# Patient Record
Sex: FEMALE | Race: BLACK OR AFRICAN AMERICAN | NOT HISPANIC OR LATINO | Employment: OTHER | URBAN - METROPOLITAN AREA
[De-identification: names, ages, dates, MRNs, and addresses within clinical notes are randomized per-mention and may not be internally consistent; named-entity substitution may affect disease eponyms.]

---

## 2018-08-22 ENCOUNTER — OFFICE VISIT (OUTPATIENT)
Dept: INTERNAL MEDICINE | Facility: CLINIC | Age: 73
End: 2018-08-22
Payer: MEDICARE

## 2018-08-22 ENCOUNTER — LAB VISIT (OUTPATIENT)
Dept: LAB | Facility: HOSPITAL | Age: 73
End: 2018-08-22
Attending: INTERNAL MEDICINE
Payer: MEDICARE

## 2018-08-22 ENCOUNTER — TELEPHONE (OUTPATIENT)
Dept: INTERNAL MEDICINE | Facility: CLINIC | Age: 73
End: 2018-08-22

## 2018-08-22 VITALS
HEART RATE: 69 BPM | DIASTOLIC BLOOD PRESSURE: 67 MMHG | SYSTOLIC BLOOD PRESSURE: 167 MMHG | TEMPERATURE: 98 F | RESPIRATION RATE: 14 BRPM | HEIGHT: 64 IN

## 2018-08-22 DIAGNOSIS — I10 ESSENTIAL HYPERTENSION: ICD-10-CM

## 2018-08-22 DIAGNOSIS — E78.5 HYPERLIPIDEMIA, UNSPECIFIED HYPERLIPIDEMIA TYPE: ICD-10-CM

## 2018-08-22 DIAGNOSIS — Z79.4 TYPE 2 DIABETES MELLITUS WITH CHRONIC KIDNEY DISEASE, WITH LONG-TERM CURRENT USE OF INSULIN, UNSPECIFIED CKD STAGE: ICD-10-CM

## 2018-08-22 DIAGNOSIS — Z11.59 NEED FOR HEPATITIS C SCREENING TEST: ICD-10-CM

## 2018-08-22 DIAGNOSIS — R56.9 SEIZURE: ICD-10-CM

## 2018-08-22 DIAGNOSIS — S91.001A WOUND OF RIGHT ANKLE, INITIAL ENCOUNTER: ICD-10-CM

## 2018-08-22 DIAGNOSIS — Z76.89 ENCOUNTER TO ESTABLISH CARE: ICD-10-CM

## 2018-08-22 DIAGNOSIS — N18.9 CHRONIC KIDNEY DISEASE, UNSPECIFIED CKD STAGE: ICD-10-CM

## 2018-08-22 DIAGNOSIS — I25.10 CORONARY ARTERY DISEASE INVOLVING NATIVE CORONARY ARTERY OF NATIVE HEART WITHOUT ANGINA PECTORIS: ICD-10-CM

## 2018-08-22 DIAGNOSIS — Z78.0 ASYMPTOMATIC POSTMENOPAUSAL STATE: ICD-10-CM

## 2018-08-22 DIAGNOSIS — Z76.89 ENCOUNTER TO ESTABLISH CARE: Primary | ICD-10-CM

## 2018-08-22 DIAGNOSIS — Z86.73 HISTORY OF STROKE: ICD-10-CM

## 2018-08-22 DIAGNOSIS — E11.22 TYPE 2 DIABETES MELLITUS WITH CHRONIC KIDNEY DISEASE, WITH LONG-TERM CURRENT USE OF INSULIN, UNSPECIFIED CKD STAGE: ICD-10-CM

## 2018-08-22 DIAGNOSIS — I50.9 CONGESTIVE HEART FAILURE, UNSPECIFIED HF CHRONICITY, UNSPECIFIED HEART FAILURE TYPE: ICD-10-CM

## 2018-08-22 DIAGNOSIS — S91.301A WOUND, OPEN, FOOT, RIGHT, INITIAL ENCOUNTER: ICD-10-CM

## 2018-08-22 PROBLEM — E11.9 DIABETES MELLITUS: Status: ACTIVE | Noted: 2018-07-06

## 2018-08-22 PROBLEM — F32.A DEPRESSIVE DISORDER: Status: ACTIVE | Noted: 2018-07-28

## 2018-08-22 LAB
ALBUMIN SERPL BCP-MCNC: 3.6 G/DL
ALP SERPL-CCNC: 68 U/L
ALT SERPL W/O P-5'-P-CCNC: 10 U/L
ANION GAP SERPL CALC-SCNC: 8 MMOL/L
AST SERPL-CCNC: 16 U/L
BASOPHILS # BLD AUTO: 0.06 K/UL
BASOPHILS NFR BLD: 1.2 %
BILIRUB SERPL-MCNC: 0.7 MG/DL
BUN SERPL-MCNC: 53 MG/DL
CALCIUM SERPL-MCNC: 9.8 MG/DL
CHLORIDE SERPL-SCNC: 111 MMOL/L
CHOLEST SERPL-MCNC: 156 MG/DL
CHOLEST/HDLC SERPL: 3.8 {RATIO}
CO2 SERPL-SCNC: 20 MMOL/L
CREAT SERPL-MCNC: 2.2 MG/DL
DIFFERENTIAL METHOD: ABNORMAL
EOSINOPHIL # BLD AUTO: 0.3 K/UL
EOSINOPHIL NFR BLD: 5.5 %
ERYTHROCYTE [DISTWIDTH] IN BLOOD BY AUTOMATED COUNT: 16 %
EST. GFR  (AFRICAN AMERICAN): 25.2 ML/MIN/1.73 M^2
EST. GFR  (NON AFRICAN AMERICAN): 21.9 ML/MIN/1.73 M^2
ESTIMATED AVG GLUCOSE: 111 MG/DL
GLUCOSE SERPL-MCNC: 87 MG/DL
HBA1C MFR BLD HPLC: 5.5 %
HCT VFR BLD AUTO: 33.8 %
HDLC SERPL-MCNC: 41 MG/DL
HDLC SERPL: 26.3 %
HGB BLD-MCNC: 11.4 G/DL
IMM GRANULOCYTES # BLD AUTO: 0.01 K/UL
IMM GRANULOCYTES NFR BLD AUTO: 0.2 %
LDLC SERPL CALC-MCNC: 93.6 MG/DL
LYMPHOCYTES # BLD AUTO: 2.1 K/UL
LYMPHOCYTES NFR BLD: 42.2 %
MCH RBC QN AUTO: 26.4 PG
MCHC RBC AUTO-ENTMCNC: 33.7 G/DL
MCV RBC AUTO: 78 FL
MONOCYTES # BLD AUTO: 0.5 K/UL
MONOCYTES NFR BLD: 8.9 %
NEUTROPHILS # BLD AUTO: 2.1 K/UL
NEUTROPHILS NFR BLD: 42 %
NONHDLC SERPL-MCNC: 115 MG/DL
NRBC BLD-RTO: 0 /100 WBC
PLATELET # BLD AUTO: 224 K/UL
PMV BLD AUTO: 12 FL
POTASSIUM SERPL-SCNC: 5.7 MMOL/L
PROT SERPL-MCNC: 6.9 G/DL
RBC # BLD AUTO: 4.32 M/UL
SODIUM SERPL-SCNC: 139 MMOL/L
TRIGL SERPL-MCNC: 107 MG/DL
TSH SERPL DL<=0.005 MIU/L-ACNC: 1.62 UIU/ML
WBC # BLD AUTO: 5.05 K/UL

## 2018-08-22 PROCEDURE — 99205 OFFICE O/P NEW HI 60 MIN: CPT | Mod: PBBFAC,PO | Performed by: INTERNAL MEDICINE

## 2018-08-22 PROCEDURE — 85025 COMPLETE CBC W/AUTO DIFF WBC: CPT

## 2018-08-22 PROCEDURE — 86803 HEPATITIS C AB TEST: CPT

## 2018-08-22 PROCEDURE — 80061 LIPID PANEL: CPT

## 2018-08-22 PROCEDURE — 80053 COMPREHEN METABOLIC PANEL: CPT

## 2018-08-22 PROCEDURE — 99205 OFFICE O/P NEW HI 60 MIN: CPT | Mod: GW,S$PBB,HPC,ICN | Performed by: INTERNAL MEDICINE

## 2018-08-22 PROCEDURE — 99999 PR PBB SHADOW E&M-NEW PATIENT-LVL V: CPT | Mod: PBBFAC,,, | Performed by: INTERNAL MEDICINE

## 2018-08-22 PROCEDURE — 36415 COLL VENOUS BLD VENIPUNCTURE: CPT | Mod: PO

## 2018-08-22 PROCEDURE — 84443 ASSAY THYROID STIM HORMONE: CPT

## 2018-08-22 PROCEDURE — 83036 HEMOGLOBIN GLYCOSYLATED A1C: CPT

## 2018-08-22 RX ORDER — VENLAFAXINE 75 MG/1
TABLET ORAL
Refills: 2 | COMMUNITY
Start: 2018-08-02 | End: 2018-08-22

## 2018-08-22 RX ORDER — CEFUROXIME AXETIL 500 MG/1
TABLET ORAL
Refills: 0 | COMMUNITY
Start: 2018-06-27 | End: 2018-08-31

## 2018-08-22 RX ORDER — CLOPIDOGREL BISULFATE 75 MG/1
TABLET ORAL
Refills: 1 | COMMUNITY
Start: 2018-07-24

## 2018-08-22 RX ORDER — NAPROXEN SODIUM 220 MG
TABLET ORAL
Refills: 2 | COMMUNITY
Start: 2018-07-06

## 2018-08-22 RX ORDER — LEVETIRACETAM 500 MG/1
TABLET ORAL
Refills: 2 | COMMUNITY
Start: 2018-07-22 | End: 2018-08-31

## 2018-08-22 RX ORDER — SIMVASTATIN 20 MG/1
TABLET, FILM COATED ORAL
Refills: 2 | COMMUNITY
Start: 2018-07-23 | End: 2018-08-31 | Stop reason: ALTCHOICE

## 2018-08-22 RX ORDER — LISINOPRIL 20 MG/1
TABLET ORAL
Refills: 0 | COMMUNITY
Start: 2018-06-27 | End: 2018-08-22 | Stop reason: SINTOL

## 2018-08-22 RX ORDER — NAPROXEN 375 MG/1
TABLET ORAL
Refills: 1 | COMMUNITY
Start: 2018-07-30 | End: 2018-08-22 | Stop reason: SINTOL

## 2018-08-22 RX ORDER — FUROSEMIDE 80 MG/1
TABLET ORAL
Refills: 1 | COMMUNITY
Start: 2018-07-24 | End: 2018-08-31 | Stop reason: ALTCHOICE

## 2018-08-22 RX ORDER — METHOCARBAMOL 750 MG/1
TABLET, FILM COATED ORAL
Refills: 2 | COMMUNITY
Start: 2018-07-23

## 2018-08-22 RX ORDER — PEN NEEDLE, DIABETIC 31 GX5/16"
NEEDLE, DISPOSABLE MISCELLANEOUS
Refills: 2 | COMMUNITY
Start: 2018-06-28 | End: 2018-11-12 | Stop reason: SDUPTHER

## 2018-08-22 RX ORDER — ASPIRIN 81 MG/1
81 TABLET ORAL DAILY
COMMUNITY

## 2018-08-22 NOTE — PROGRESS NOTES
Subjective:       Patient ID: Cesia Stevens is a 73 y.o. female.    Chief Complaint: Annual Exam (get establish) and Shoulder Pain (right)    HPI    73 y.o. female here to discuss healthcare maintenance and f/u of chronic medical conditions. She presents with her daughter and granddaughter. She was 15 minutes late for her appointment. She brings in her current medications, but no other records available. She was recently discharged from the hospital in [place] for seizure, UTI, and sepsis. She does have home health currently.       Health Maintenance:   Lipid disorders/ASCVD risk: due    DM: FBG due  Hepatitis C (5352-3246): due   Breast Cancer (40-74y): Mammogram defer for now   Colorectal Cancer (50-75yr): Colonoscopy declines    DEXA (F>66 yo, M >71yo): due       Vaccines:   Influenza (yearly)   Tetanus (every 10 yrs - 1st tdap) defer    PPSV23(>66yo or <65 w/ lung dz, smoking, DM) pt unsure   PCV13 (> 65 or <65 w/ immunocompromised) pt unsure   Zoster (>59yo) pt unsure     Medical Problems:  1. DM2:  levemir 30u BID, decreased by her family from 75 u bid down to 60 u bid then further decrease to 30 u bid given low normal glucose.  2. HLD: simvastatin 20mg qHS  3. HTN: lisinopril 20mg daily  4. Seizure disorder: keppra 500mg bid  5. CHF: lasix 80mg daily  6. CAD: plavix 75mg daily  7. Shoulder pain: she is following w/ orthopedist at [facility]. She is taking naproxen bid      Past Medical History:   Diagnosis Date    Arthritis     CHF (congestive heart failure)     Coronary artery disease     Diabetes mellitus, type 2     Disorder of kidney and ureter     Hyperlipidemia     Hypertension     Seizures     Stroke      Past Surgical History:   Procedure Laterality Date    CORONARY ARTERY BYPASS GRAFT       No family history on file.  Social History     Socioeconomic History    Marital status: Single     Spouse name: Not on file    Number of children: Not on file    Years of education: Not on file    Highest  "education level: Not on file   Social Needs    Financial resource strain: Not on file    Food insecurity - worry: Not on file    Food insecurity - inability: Not on file    Transportation needs - medical: Not on file    Transportation needs - non-medical: Not on file   Occupational History    Not on file   Tobacco Use    Smoking status: Never Smoker    Smokeless tobacco: Never Used   Substance and Sexual Activity    Alcohol use: No     Frequency: Never    Drug use: No    Sexual activity: No   Other Topics Concern    Not on file   Social History Narrative    Not on file     Review of patient's allergies indicates:  Allergies not on file    Current Outpatient Medications:     aspirin (ECOTRIN) 81 MG EC tablet, Take 81 mg by mouth once daily., Disp: , Rfl:     BD ULTRA-FINE MINI PEN NEEDLE 31 gauge x 3/16" Ndle, USE FIVE TIMES D, Disp: , Rfl: 2    cefUROXime (CEFTIN) 500 MG tablet, TK 1 T PO  BID FOR 14 DAYS, Disp: , Rfl: 0    clopidogrel (PLAVIX) 75 mg tablet, , Disp: , Rfl: 1    furosemide (LASIX) 80 MG tablet, TK 1 T PO  BID, Disp: , Rfl: 1    insulin syringe-needle U-100 1/2 mL 30 gauge Syrg, U BID UTD, Disp: , Rfl: 2    levETIRAcetam (KEPPRA) 500 MG Tab, TK 1 T PO  BID, Disp: , Rfl: 2    lisinopril (PRINIVIL,ZESTRIL) 20 MG tablet, TK 1 T PO BID, Disp: , Rfl: 0    methocarbamol (ROBAXIN) 750 MG Tab, TK 1  T PO HS PRF MUSCLE PAIN, Disp: , Rfl: 2    naproxen (NAPROSYN) 375 MG tablet, TK 1  T PO BID WF PRN P, Disp: , Rfl: 1    simvastatin (ZOCOR) 20 MG tablet, TK 1 T PO QD IN THE EVENING FOR CHOLESTEROL, Disp: , Rfl: 2      Review of Systems   Constitutional: Positive for appetite change. Negative for fever.   HENT: Negative for trouble swallowing.    Eyes: Positive for visual disturbance.   Respiratory: Negative for cough and shortness of breath.    Cardiovascular: Positive for leg swelling. Negative for chest pain.   Gastrointestinal: Negative for abdominal pain and blood in stool. " "  Genitourinary: Positive for frequency. Negative for difficulty urinating.   Musculoskeletal: Positive for arthralgias.   Skin: Positive for wound.   Neurological: Positive for seizures.   Hematological: Negative for adenopathy.       Objective:        Vitals:    08/22/18 1030   BP: (!) 167/67   BP Location: Left arm   Patient Position: Sitting   BP Method: Large (Automatic)   Pulse: 69   Resp: 14   Temp: 97.8 °F (36.6 °C)   TempSrc: Oral   Weight: Comment: unable to  weight   Height: 5' 4" (1.626 m)       There is no height or weight on file to calculate BMI.    Physical Exam   Constitutional: She appears well-developed and well-nourished. No distress.   HENT:   Head: Normocephalic and atraumatic.   Right Ear: External ear normal.   Left Ear: External ear normal.   Nose: Nose normal.   Mouth/Throat: Oropharynx is clear and moist.   Eyes: Conjunctivae and EOM are normal. Right eye exhibits no discharge. Left eye exhibits no discharge.   Neck: Normal range of motion. Neck supple.   Cardiovascular: Normal rate, regular rhythm, normal heart sounds and intact distal pulses.   Pulmonary/Chest: Effort normal and breath sounds normal. No respiratory distress.   Abdominal: Soft. Bowel sounds are normal. There is no tenderness.   Musculoskeletal: She exhibits edema.        Right shoulder: She exhibits decreased range of motion and pain.   Lymphadenopathy:     She has no cervical adenopathy.   Neurological: She is alert. No cranial nerve deficit.   Skin: Skin is warm and dry. She is not diaphoretic. No erythema.   Psychiatric: She has a normal mood and affect. Her behavior is normal. Thought content normal.       Assessment:     1. Encounter to establish care    2. Type 2 diabetes mellitus with chronic kidney disease, with long-term current use of insulin, unspecified CKD stage    3. Seizure    4. Coronary artery disease involving native coronary artery of native heart without angina pectoris    5. History of stroke    6. " Need for hepatitis C screening test    7. Hyperlipidemia, unspecified hyperlipidemia type    8. Essential hypertension    9. Congestive heart failure, unspecified HF chronicity, unspecified heart failure type    10. Chronic kidney disease, unspecified CKD stage    11. Asymptomatic postmenopausal state    12. Wound of right ankle, initial encounter    13. Wound, open, foot, right, initial encounter           Plan:         1. Encounter to establish care  - reviewed healthcare maintenance and pt's chronic medical problems.   - CBC auto differential; Future  - Comprehensive metabolic panel; Future  - Hemoglobin A1c; Future  - Lipid panel; Future  - TSH; Future  - Urinalysis; Future  - Microalbumin/creatinine urine ratio; Future  - Hepatitis C antibody; Future    2. Type 2 diabetes mellitus with chronic kidney disease, with long-term current use of insulin, unspecified CKD stage  - continue levemir 30 u bid. Bring glucose log to f/u appt in 2 weeks.  - Comprehensive metabolic panel; Future  - Hemoglobin A1c; Future  - Urinalysis; Future  - Microalbumin/creatinine urine ratio; Future  - Ambulatory Referral to Optometry    3. Seizure  - continue keppra  - Ambulatory Referral to Neurology    4. Coronary artery disease involving native coronary artery of native heart without angina pectoris  - continue aspirin and statin and plavix  - Ambulatory referral to Cardiology    5. History of stroke  - continue plavix  - Ambulatory Referral to Neurology    6. Need for hepatitis C screening test  - Hepatitis C antibody; Future    7. Hyperlipidemia, unspecified hyperlipidemia type  - continue statin  - Lipid panel; Future    8. Essential hypertension  - continue lisinopril  - TSH; Future    9. Congestive heart failure, unspecified HF chronicity, unspecified heart failure type  - continue ace-i  - Ambulatory referral to Cardiology  - 2D echo with color flow doppler; Future    10. Chronic kidney disease, unspecified CKD stage  - STOP  NAPROXEN  - CBC auto differential; Future  - Ambulatory Referral to Nephrology    11. Asymptomatic postmenopausal state  - DXA Bone Density Spine And Hip; Future    12. Wound of right ankle, initial encounter  - Ambulatory consult to Wound Clinic    13. Wound, open, foot, right, initial encounter  - Ambulatory consult to Wound Clinic      Medical records requested from previous PCP and from recent hospitalization.   rtc 2 weeks w/ glucose log

## 2018-08-22 NOTE — TELEPHONE ENCOUNTER
Please inform patient of test results:   Potassium is elevated. Please stop lisinopril as this can raise the potassium level. Monitor blood pressure closely. We can start a different medication if needed.  Creatinine is elevated, which means kidney function is decreased. STOP NAPROXEN. Avoid any otc NSAIDs (ibuprofen - advil, motrin, aleve, goody's powder, etc.)    Remaining labs still pending.

## 2018-08-23 ENCOUNTER — TELEPHONE (OUTPATIENT)
Dept: INTERNAL MEDICINE | Facility: CLINIC | Age: 73
End: 2018-08-23

## 2018-08-23 DIAGNOSIS — D50.9 MICROCYTIC ANEMIA: Primary | ICD-10-CM

## 2018-08-23 DIAGNOSIS — E87.5 HYPERKALEMIA: ICD-10-CM

## 2018-08-23 PROBLEM — E11.29 TYPE 2 DIABETES MELLITUS WITH KIDNEY COMPLICATION, WITH LONG-TERM CURRENT USE OF INSULIN: Status: ACTIVE | Noted: 2018-07-06

## 2018-08-23 PROBLEM — Z79.4 TYPE 2 DIABETES MELLITUS WITH KIDNEY COMPLICATION, WITH LONG-TERM CURRENT USE OF INSULIN: Status: ACTIVE | Noted: 2018-07-06

## 2018-08-23 LAB — HCV AB SERPL QL IA: NEGATIVE

## 2018-08-24 ENCOUNTER — LAB VISIT (OUTPATIENT)
Dept: LAB | Facility: HOSPITAL | Age: 73
End: 2018-08-24
Attending: INTERNAL MEDICINE
Payer: MEDICARE

## 2018-08-24 DIAGNOSIS — E11.22 TYPE 2 DIABETES MELLITUS WITH CHRONIC KIDNEY DISEASE, WITH LONG-TERM CURRENT USE OF INSULIN, UNSPECIFIED CKD STAGE: ICD-10-CM

## 2018-08-24 DIAGNOSIS — Z79.4 TYPE 2 DIABETES MELLITUS WITH CHRONIC KIDNEY DISEASE, WITH LONG-TERM CURRENT USE OF INSULIN, UNSPECIFIED CKD STAGE: ICD-10-CM

## 2018-08-24 DIAGNOSIS — Z76.89 ENCOUNTER TO ESTABLISH CARE: ICD-10-CM

## 2018-08-24 LAB
ALBUMIN/CREAT UR: 26.5 UG/MG
AMORPH CRY UR QL COMP ASSIST: ABNORMAL
BILIRUB UR QL STRIP: NEGATIVE
CLARITY UR REFRACT.AUTO: ABNORMAL
COLOR UR AUTO: YELLOW
CREAT UR-MCNC: 272 MG/DL
GLUCOSE UR QL STRIP: NEGATIVE
HGB UR QL STRIP: NEGATIVE
HYALINE CASTS UR QL AUTO: 30 /LPF
KETONES UR QL STRIP: NEGATIVE
LEUKOCYTE ESTERASE UR QL STRIP: NEGATIVE
MICROALBUMIN UR DL<=1MG/L-MCNC: 72 UG/ML
MICROSCOPIC COMMENT: ABNORMAL
NITRITE UR QL STRIP: NEGATIVE
PH UR STRIP: 5 [PH] (ref 5–8)
PROT UR QL STRIP: NEGATIVE
SP GR UR STRIP: 1.02 (ref 1–1.03)
SQUAMOUS #/AREA URNS AUTO: 36 /HPF
URN SPEC COLLECT METH UR: ABNORMAL
UROBILINOGEN UR STRIP-ACNC: 2 EU/DL
WBC #/AREA URNS AUTO: 1 /HPF (ref 0–5)

## 2018-08-24 PROCEDURE — 81001 URINALYSIS AUTO W/SCOPE: CPT

## 2018-08-24 PROCEDURE — 82043 UR ALBUMIN QUANTITATIVE: CPT

## 2018-08-30 ENCOUNTER — CLINICAL SUPPORT (OUTPATIENT)
Dept: CARDIOLOGY | Facility: CLINIC | Age: 73
End: 2018-08-30
Attending: INTERNAL MEDICINE
Payer: MEDICARE

## 2018-08-30 ENCOUNTER — APPOINTMENT (OUTPATIENT)
Dept: RADIOLOGY | Facility: CLINIC | Age: 73
End: 2018-08-30
Attending: INTERNAL MEDICINE
Payer: MEDICARE

## 2018-08-30 ENCOUNTER — OFFICE VISIT (OUTPATIENT)
Dept: OPTOMETRY | Facility: CLINIC | Age: 73
End: 2018-08-30
Payer: MEDICARE

## 2018-08-30 DIAGNOSIS — H04.123 DRY EYE SYNDROME OF BILATERAL LACRIMAL GLANDS: ICD-10-CM

## 2018-08-30 DIAGNOSIS — I50.9 CONGESTIVE HEART FAILURE, UNSPECIFIED HF CHRONICITY, UNSPECIFIED HEART FAILURE TYPE: ICD-10-CM

## 2018-08-30 DIAGNOSIS — Z78.0 ASYMPTOMATIC POSTMENOPAUSAL STATE: ICD-10-CM

## 2018-08-30 DIAGNOSIS — E11.3593 PROLIFERATIVE DIABETIC RETINOPATHY OF BOTH EYES ASSOCIATED WITH TYPE 2 DIABETES MELLITUS, UNSPECIFIED PROLIFERATIVE RETINOPATHY TYPE: Primary | ICD-10-CM

## 2018-08-30 LAB
DIASTOLIC DYSFUNCTION: NO
ESTIMATED PA SYSTOLIC PRESSURE: 16.4
MITRAL VALVE MOBILITY: NORMAL
RETIRED EF AND QEF - SEE NOTES: 60 (ref 55–65)

## 2018-08-30 PROCEDURE — 77080 DXA BONE DENSITY AXIAL: CPT | Mod: 26,GW,, | Performed by: INTERNAL MEDICINE

## 2018-08-30 PROCEDURE — 99999 PR PBB SHADOW E&M-EST. PATIENT-LVL II: CPT | Mod: PBBFAC,,, | Performed by: OPTOMETRIST

## 2018-08-30 PROCEDURE — 92004 COMPRE OPH EXAM NEW PT 1/>: CPT | Mod: GW,S$PBB,ICN, | Performed by: OPTOMETRIST

## 2018-08-30 PROCEDURE — 93306 TTE W/DOPPLER COMPLETE: CPT | Mod: PBBFAC,PO | Performed by: INTERNAL MEDICINE

## 2018-08-30 PROCEDURE — 77080 DXA BONE DENSITY AXIAL: CPT | Mod: TC,PO

## 2018-08-30 PROCEDURE — 99212 OFFICE O/P EST SF 10 MIN: CPT | Mod: PBBFAC,PO,25 | Performed by: OPTOMETRIST

## 2018-08-30 NOTE — LETTER
August 30, 2018      Nan Mane MD  [address] [facility] [facility]  [place] [address]           [place] [facility]  [address] [facility] [facility]  [place] [address]  Phone: [phone]  Fax: [phone]          Patient: Cesia Stevens   MR Number: [id number]   YOB: 1945   Date of Visit: 8/30/2018       Dear Dr. Nan Mane:    Thank you for referring Cesia Stevens to me for evaluation. Attached you will find relevant portions of my assessment and plan of care.    If you have questions, please do not hesitate to call me. I look forward to following Cesia Stevens along with you.    Sincerely,    Shira Guerrier, OD    Enclosure  CC:  No Recipients    If you would like to receive this communication electronically, please contact [email or web address] or [phone] to request more information on [facility] access.    For providers and/or their staff who would like to refer a patient to [facility], please contact us through our one-stop-shop provider referral line, Feliciano Bolaños, at [phone].    If you feel you have received this communication in error or would no longer like to receive these types of communications, please e-mail [email or web address]
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
## 2018-08-31 ENCOUNTER — OFFICE VISIT (OUTPATIENT)
Dept: CARDIOLOGY | Facility: CLINIC | Age: 73
End: 2018-08-31
Payer: MEDICARE

## 2018-08-31 ENCOUNTER — OFFICE VISIT (OUTPATIENT)
Dept: NEUROLOGY | Facility: CLINIC | Age: 73
End: 2018-08-31
Payer: MEDICARE

## 2018-08-31 ENCOUNTER — TELEPHONE (OUTPATIENT)
Dept: INTERNAL MEDICINE | Facility: CLINIC | Age: 73
End: 2018-08-31

## 2018-08-31 ENCOUNTER — OFFICE VISIT (OUTPATIENT)
Dept: WOUND CARE | Facility: CLINIC | Age: 73
End: 2018-08-31
Payer: OTHER MISCELLANEOUS

## 2018-08-31 VITALS
DIASTOLIC BLOOD PRESSURE: 68 MMHG | BODY MASS INDEX: 40.91 KG/M2 | HEIGHT: 64 IN | TEMPERATURE: 97 F | DIASTOLIC BLOOD PRESSURE: 57 MMHG | SYSTOLIC BLOOD PRESSURE: 125 MMHG | BODY MASS INDEX: 41.13 KG/M2 | HEIGHT: 64 IN | HEART RATE: 51 BPM | SYSTOLIC BLOOD PRESSURE: 160 MMHG | WEIGHT: 239.63 LBS | HEART RATE: 68 BPM

## 2018-08-31 VITALS — DIASTOLIC BLOOD PRESSURE: 61 MMHG | HEIGHT: 64 IN | HEART RATE: 70 BPM | SYSTOLIC BLOOD PRESSURE: 121 MMHG

## 2018-08-31 DIAGNOSIS — Z95.1 S/P CABG (CORONARY ARTERY BYPASS GRAFT): ICD-10-CM

## 2018-08-31 DIAGNOSIS — E87.5 HYPERKALEMIA: ICD-10-CM

## 2018-08-31 DIAGNOSIS — S91.301A OPEN WOUND OF RIGHT FOOT, INITIAL ENCOUNTER: Primary | ICD-10-CM

## 2018-08-31 DIAGNOSIS — Z79.4 TYPE 2 DIABETES MELLITUS WITH CHRONIC KIDNEY DISEASE, WITH LONG-TERM CURRENT USE OF INSULIN, UNSPECIFIED CKD STAGE: ICD-10-CM

## 2018-08-31 DIAGNOSIS — R56.9 SEIZURE: Primary | ICD-10-CM

## 2018-08-31 DIAGNOSIS — N18.4 STAGE 4 CHRONIC KIDNEY DISEASE: ICD-10-CM

## 2018-08-31 DIAGNOSIS — E11.22 TYPE 2 DIABETES MELLITUS WITH CHRONIC KIDNEY DISEASE, WITH LONG-TERM CURRENT USE OF INSULIN, UNSPECIFIED CKD STAGE: ICD-10-CM

## 2018-08-31 DIAGNOSIS — I25.10 CORONARY ARTERY DISEASE INVOLVING NATIVE CORONARY ARTERY OF NATIVE HEART WITHOUT ANGINA PECTORIS: Primary | ICD-10-CM

## 2018-08-31 DIAGNOSIS — I10 ESSENTIAL HYPERTENSION: ICD-10-CM

## 2018-08-31 DIAGNOSIS — E66.01 MORBID OBESITY: ICD-10-CM

## 2018-08-31 DIAGNOSIS — Z86.73 HISTORY OF STROKE: ICD-10-CM

## 2018-08-31 DIAGNOSIS — E78.5 HYPERLIPIDEMIA, UNSPECIFIED HYPERLIPIDEMIA TYPE: ICD-10-CM

## 2018-08-31 PROCEDURE — 11042 DBRDMT SUBQ TIS 1ST 20SQCM/<: CPT | Mod: S$PBB,GV,, | Performed by: NURSE PRACTITIONER

## 2018-08-31 PROCEDURE — 99213 OFFICE O/P EST LOW 20 MIN: CPT | Mod: PBBFAC,25,27 | Performed by: PSYCHIATRY & NEUROLOGY

## 2018-08-31 PROCEDURE — 99205 OFFICE O/P NEW HI 60 MIN: CPT | Mod: GW,S$PBB,HPC,ICN | Performed by: PSYCHIATRY & NEUROLOGY

## 2018-08-31 PROCEDURE — 99213 OFFICE O/P EST LOW 20 MIN: CPT | Mod: PBBFAC,27,25 | Performed by: INTERNAL MEDICINE

## 2018-08-31 PROCEDURE — 99213 OFFICE O/P EST LOW 20 MIN: CPT | Mod: PBBFAC,25 | Performed by: NURSE PRACTITIONER

## 2018-08-31 PROCEDURE — 99203 OFFICE O/P NEW LOW 30 MIN: CPT | Mod: 25,S$PBB,GV, | Performed by: NURSE PRACTITIONER

## 2018-08-31 PROCEDURE — 99999 PR PBB SHADOW E&M-EST. PATIENT-LVL III: CPT | Mod: PBBFAC,,, | Performed by: NURSE PRACTITIONER

## 2018-08-31 PROCEDURE — 11042 DBRDMT SUBQ TIS 1ST 20SQCM/<: CPT | Mod: PBBFAC | Performed by: NURSE PRACTITIONER

## 2018-08-31 PROCEDURE — 99999 PR PBB SHADOW E&M-EST. PATIENT-LVL III: CPT | Mod: PBBFAC,,, | Performed by: PSYCHIATRY & NEUROLOGY

## 2018-08-31 PROCEDURE — 99999 PR PBB SHADOW E&M-EST. PATIENT-LVL III: CPT | Mod: PBBFAC,,, | Performed by: INTERNAL MEDICINE

## 2018-08-31 PROCEDURE — 99204 OFFICE O/P NEW MOD 45 MIN: CPT | Mod: GW,S$PBB,, | Performed by: INTERNAL MEDICINE

## 2018-08-31 RX ORDER — TRAZODONE HYDROCHLORIDE 50 MG/1
50 TABLET ORAL NIGHTLY
Qty: 30 TABLET | Refills: 11 | Status: SHIPPED | OUTPATIENT
Start: 2018-08-31 | End: 2019-08-31

## 2018-08-31 RX ORDER — DONEPEZIL HYDROCHLORIDE 5 MG/1
5 TABLET, FILM COATED ORAL NIGHTLY
Qty: 30 TABLET | Refills: 11 | Status: SHIPPED | OUTPATIENT
Start: 2018-08-31 | End: 2018-09-19

## 2018-08-31 RX ORDER — ATORVASTATIN CALCIUM 40 MG/1
40 TABLET, FILM COATED ORAL DAILY
Qty: 30 TABLET | Refills: 11 | Status: SHIPPED | OUTPATIENT
Start: 2018-08-31

## 2018-08-31 NOTE — TELEPHONE ENCOUNTER
----- Message from Nan Mane MD sent at 8/31/2018  8:03 AM CDT -----  Heart pumping and relaxation function is normal.

## 2018-08-31 NOTE — TELEPHONE ENCOUNTER
Daughter answered phone.  They are heading to wound care today.  She will give her results of heart test.  I gave all of 's comments.  She expressed understanding.

## 2018-08-31 NOTE — PROGRESS NOTES
Name: Cesia Stevens  MRN: [id number]   CSN: [id number]      Date: 08/31/2018    HISTORY OF PRESENT ILLNESS (HPI)  The patient is a 73 y.o. BF  The patient was accompanied today by family members who provided some of the history.    Pt had confusional episode at home in June.  Not clearly a seizure.  Family more concerned about chronic decline in memory and judgment and suspect some degree of dementia  Confusion is more persistent than episodic and therfore seizures less likely  They report that pt becoming more paranoid and resists taking meds  She had been prescribed Keppra 500mg BID for recent episode, but from description was more typical episode of confusion with no clear description of seizure.  Not tolerating Keppra very well and doesn't want to take.        Seizure Triggers  Sleep Deprivation - None  Other medications - None  Psych/stress - None  Photic stimulation - None  Hyperventilation - None  Medical Problems - None  Menses - No  Sensory Stimulation (light, sound, etc) - None  Missed dose of meds - None    AED Treatments  Present regimen  Keppra 500mg BID    Prior treatments      Not tried  acetazolamide (Diamox, AZM)  amantadine  carbamazepine (Tegretol, CBZ)  clobezam (Onfi or Frizium, CLB)  ethosuximide (Zarontin, ESM)  eslicarbazine (Aptiom, ESL)  felbamate (felbatol, FBM)  gabapentin (Neurontin, GBP)  lacosamide (Vimpat, LCM)   lamotrigine (Lamictal, LTG)   levetiracetam (Keppra, LEV)  methsuximide (Celontin, MSM)  methyphenytoin (Mesantion, MHT)  oxcarbazepine (Trileptal OXC)  perampanel (Fycompa, FCP)   phenobarbital (Pb)  phenytoin (Dilantin, PHT)  pregabalin (Lyrica, PGB)  primidone (Mysoline, PRM)  retigabine (Potiga, RTG)  rufinamide (Banzel, RUF)  tiagabine (Gabatril,  TGB)  topiramate (Topamax, TPM)  viagabatrin, (Sabril, VGB)  vagal nerve stimulator (VNS)  valproic acid (Depakote, VPA)  zonisamide (Zonegran, ZNS)  Benzodiazepines  diazepam - rectal (Diastatl)  diazepam - oral (Valium,  DZ)  clonazepam (Klonopin, CZP)  clorazepate (Tranxene, CLZ)  Ativan  Brain Stimulation  Vagal Nerve Stimulation-n/a  DBS- n/a    Compliance method  Memory - yes  Mom or Spouse - Yes  Pill Box - no  Jeronimo calendar - no  Turn over medication bottle - no  Phone alarm - no    Seizure Evaluation  EEG Routine -   EEG Ambulatory -   EEG\Video Monitoring -   MRI/MRA -   CT/CTA Scan -   PET Scan -   Neuropsychological evaluation -   DEXA Scan    Potential Epilepsy Risk Factors:   Pregnancy/Labor/Delivery - full term uncomplicated pregnancy labor and vaginal delivery  Febrile seizures - none  Head injury  - none  CNS infection - none     Stroke - none  Family Hx of Sz - none    PAST MEDICAL HISTORY:   Active Ambulatory Problems     Diagnosis Date Noted    Congestive heart failure 04/05/2006    Depressive disorder 07/28/2018    Type 2 diabetes mellitus with kidney complication, with long-term current use of insulin 07/06/2018    Hyperlipidemia 07/06/2018    Essential hypertension 07/06/2018    Seizure 06/11/2018    History of stroke 08/22/2018    Chronic kidney disease 08/22/2018    Microcytic anemia 08/23/2018    Hyperkalemia 08/23/2018     Resolved Ambulatory Problems     Diagnosis Date Noted    No Resolved Ambulatory Problems     Past Medical History:   Diagnosis Date    Arthritis     CHF (congestive heart failure)     Coronary artery disease     Diabetes mellitus, type 2     Disorder of kidney and ureter     Hyperlipidemia     Hypertension     Seizures     Stroke         PAST SURGICAL HISTORY:   Past Surgical History:   Procedure Laterality Date    CORONARY ARTERY BYPASS GRAFT      HYSTERECTOMY      PERIPHERAL ARTERIAL STENT GRAFT          FAMILY HISTORY: No family history on file.      SOCIAL HISTORY:   Social History     Socioeconomic History    Marital status: Single     Spouse name: Not on file    Number of children: Not on file    Years of education: Not on file    Highest education level:  Not on file   Social Needs    Financial resource strain: Not on file    Food insecurity - worry: Not on file    Food insecurity - inability: Not on file    Transportation needs - medical: Not on file    Transportation needs - non-medical: Not on file   Occupational History    Not on file   Tobacco Use    Smoking status: Never Smoker    Smokeless tobacco: Never Used   Substance and Sexual Activity    Alcohol use: No     Frequency: Never    Drug use: No    Sexual activity: No   Other Topics Concern    Not on file   Social History Narrative    Not on file        SUBSTANCE USE:  Social History     Tobacco Use    Smoking status: Never Smoker    Smokeless tobacco: Never Used   Substance and Sexual Activity    Alcohol use: No     Frequency: Never    Drug use: No    Sexual activity: No      Social History     Tobacco Use    Smoking status: Never Smoker    Smokeless tobacco: Never Used   Substance Use Topics    Alcohol use: No     Frequency: Never        ALLERGIES: Patient has no known allergies.       Review of Systems   Constitutional: Negative for chills, diaphoresis, fever, malaise/fatigue and weight loss.   HENT: Negative for ear pain, hearing loss, nosebleeds and tinnitus.    Eyes: Negative for blurred vision, double vision, photophobia and pain.   Respiratory: Negative for cough, hemoptysis and shortness of breath.    Cardiovascular: Negative for chest pain, palpitations, orthopnea and leg swelling.   Gastrointestinal: Negative for abdominal pain, blood in stool, constipation, diarrhea, heartburn, nausea and vomiting.   Genitourinary: Negative for dysuria and hematuria.   Musculoskeletal: Negative for falls, joint pain and myalgias.   Skin: Negative for itching and rash.   Neurological: Negative for dizziness, tingling, tremors, sensory change, speech change, focal weakness, loss of consciousness, weakness and headaches.   Endo/Heme/Allergies: Negative for environmental allergies. Does not  bruise/bleed easily.   Psychiatric/Behavioral: Negative for depression, hallucinations, memory loss and substance abuse. The patient is not nervous/anxious and does not have insomnia.          PHYSICAL EXAM:    There were no vitals taken for this visit.      Neurologic Exam      Higher Cortical Function:    Patient is a well developed, pleasant, well groomed individual appearing their stated age  Oriented - intact to person, place and time    Fund of knowledge was appropriate.    Language - Speech was fluent without evidence for an aphasia.  R-L Orientation - Intact   Agnosias, agraphesthesia, or astereognosis - not present.   Cranial Nerves II - XII:    EOMs were intact with normal smooth and no nystagmus.    PERRLA. Funduscopic exam - disc were flat with normal A/V ratio and no exudates or hemorrhages.   Visual fields were full to confrontation.    Motor - facial movement was symmetrical and normal.    Facial sensory - Light touch and pin prick sensations were normal.    Hearing was normal.  Palate moved well and was symmetrical    Tongue movement was full & the patient could speak without difficulty.  Motor: Power, bulk and tone were normal in all extremities.  Coordination:  Normal  Gait:  Normal  Tremor: resting, postural, intentional - none  Cardiovascular:  No edema  Skin: No rash         IMPRESSION  1. Single spell. Not clearly seizure like. Not toelrating Keppra well  2. Chronic cognitive decline, possible dementia      DISPOSITION:   OK to hold Keppra for now given single episode only and no clear description of seizure or h/o epilepsy  Refer to Dr Boone for dementia evaluation  Prescribe Aricept and trazodone today in meantime to hopefully help with memory/poor sleep  If seizure like episodes progress in future can work up further for epilepsy, either by DR Boone or myself

## 2018-08-31 NOTE — PROGRESS NOTES
Subjective:       Patient ID: Cesia Stevens is a 73 y.o. female.    Chief Complaint: Wound Check    Mrs. Stevens is a 73 year old female who was living in [place] with her . She injured her right foot on a nail. Treatment was received at the time but she can not indicate what the treatment was. She verbalized being up to date on her tetnus immunization. Her family noticed that she was not caring for herself properly due to multiple medical conditions and a history of CVA with right sided weakness. She and her  were brought to live with a daughter who now assumes care for her. The daughter reports having home health services for wound care but they have been discharged. They report cleaning the wound with soap and water and keeping covered. She is here today for an evaluation and recommendations. Healing delayed and complicated by DM Type 2 insulin dependent.      Review of Systems   Constitutional: Negative for chills, diaphoresis, fatigue and fever.   HENT: Negative.  Negative for ear pain, nosebleeds, sinus pain, sore throat and trouble swallowing.    Eyes: Positive for visual disturbance. Negative for pain and redness.   Respiratory: Positive for cough. Negative for shortness of breath and wheezing.         Reports hx of asthma   Cardiovascular: Negative for chest pain and palpitations.        Hx of CHF, Hx of MI, Hx of CABG, Hx of CVA   Gastrointestinal: Negative.  Negative for abdominal distention, abdominal pain, blood in stool, nausea and vomiting.   Endocrine: Negative for polydipsia, polyphagia and polyuria.        Type 2 DM insulin dependent   Genitourinary: Negative.  Negative for flank pain and hematuria.   Musculoskeletal: Positive for arthralgias (knees). Negative for back pain, joint swelling and myalgias.        Torn rotator cuff right shoulder   Skin: Positive for wound.   Allergic/Immunologic: Negative.    Neurological: Positive for seizures (One seizure in June, daughter states  from UTI. Medication discontinued today. ) and weakness (right sided weakness from CVA, general debility). Negative for facial asymmetry and headaches.   Hematological: Negative.  Negative for adenopathy. Does not bruise/bleed easily.   Psychiatric/Behavioral: Positive for confusion (dementia). Negative for agitation, dysphoric mood and sleep disturbance. The patient is not nervous/anxious.        Objective:      Physical Exam   Constitutional: She is oriented to person, place, and time. Vital signs are normal. She appears well-developed. No distress.   HENT:   Head: Normocephalic.   Mouth/Throat: Oropharynx is clear and moist.   Eyes: Conjunctivae, EOM and lids are normal. Pupils are equal, round, and reactive to light.   Neck: Trachea normal and normal range of motion. Carotid bruit is not present. No thyromegaly present.   Cardiovascular: Normal rate, normal heart sounds and intact distal pulses. An irregular rhythm present.   Pulses:       Popliteal pulses are 0 on the right side, and 0 on the left side.        Dorsalis pedis pulses are 1+ on the right side, and 0 on the left side.        Posterior tibial pulses are 1+ on the right side.   Pulmonary/Chest: Effort normal and breath sounds normal. No respiratory distress. She has no wheezes. She has no rales.   Abdominal: Soft. Bowel sounds are normal. She exhibits no distension. There is no tenderness.   Musculoskeletal:        Right shoulder: She exhibits swelling (BLE).        Right foot: There is decreased range of motion.        Feet:    Feet:   Right Foot:   Protective Sensation: 5 sites tested. 3 sites sensed.   Skin Integrity: Positive for ulcer, callus and dry skin.   Left Foot:   Protective Sensation: 0 sites tested.   Lymphadenopathy:     She has no cervical adenopathy.   Neurological: She is alert and oriented to person, place, and time.   Skin: Skin is warm and dry. Capillary refill takes 2 to 3 seconds. She is not diaphoretic. No erythema.    Psychiatric: She has a normal mood and affect. Thought content normal.   Vitals reviewed.          Procedure: Cesia was seen in the clinic room and placed in the supine position on the treatment table. Attention was directed to the Right 5th metatarsal head. The wound was covered with 100% fibrin film and a  callused rim. No acute signs of infection were noted. The wound was non-tender. The wound was prepped with betadine. Utilizing a 3mm curette, I debrided the subcutaneous tissue to excise viable tissue inside the wound margins. The patient tolerated well. Because of a lack of sensation, anesthesia was not necessary. The wound was flushed with wound cleanser There was minimal bleeding with debridement which was well controlled with direct pressure. The wound was then dressed with endoform followed by a border gauze dressing. The patient tolerated the procedure well.  Lab Results   Component Value Date    HGBA1C 5.5 08/22/2018     Lab Results   Component Value Date    ALBUMIN 3.6 08/22/2018     Assessment:       1. Open wound of right foot, initial encounter    2. Type 2 diabetes mellitus with chronic kidney disease, with long-term current use of insulin, unspecified CKD stage        Plan:       Clean wound with wound cleanser or normal saline  Apply collagen dressing to wound bed  Cut a small piece the size of the wound  Cover with a border gauze  Change daily  Return to clinic in 2 weeks  Monitor for signs of infection

## 2018-08-31 NOTE — LETTER
August 31, 2018      Nan Mane MD  [address]  [place] [address]           [facility]  [address]  [place] [address]  Phone: [phone]          Patient: Cesia Stevens   MR Number: [id number]   YOB: 1945   Date of Visit: 8/31/2018       Dear Dr. Nan Mane:    Thank you for referring Cesia Stevens to me for evaluation. Attached you will find relevant portions of my assessment and plan of care.    If you have questions, please do not hesitate to call me. I look forward to following Cesia Stevens along with you.    Sincerely,    Jose Mike MD    Enclosure  CC:  No Recipients    If you would like to receive this communication electronically, please contact [email or web address] or [phone] to request more information on [facility] Link access.    For providers and/or their staff who would like to refer a patient to [facility], please contact us through our one-stop-shop provider referral line, [facility] , at [phone].    If you feel you have received this communication in error or would no longer like to receive these types of communications, please e-mail [email or web address]

## 2018-08-31 NOTE — LETTER
August 31, 2018      Nan Mane MD  [address]  [place] [address]           [facility]  [address]  [place] [address]  Phone: [phone]          Patient: Cesia Stevens   MR Number: [id number]   YOB: 1945   Date of Visit: 8/31/2018       Dear Dr. Nan Mane:    Thank you for referring Cesia Stevens to me for evaluation. Attached you will find relevant portions of my assessment and plan of care.    If you have questions, please do not hesitate to call me. I look forward to following Cesia Stevens along with you.    Sincerely,    Lani Lu NP    Enclosure  CC:  No Recipients    If you would like to receive this communication electronically, please contact [email or web address] or [phone] to request more information on [employer] Link access.    For providers and/or their staff who would like to refer a patient to [facility], please contact us through our one-stop-shop provider referral line, [facility], at [phone].    If you feel you have received this communication in error or would no longer like to receive these types of communications, please e-mail [email or web address]

## 2018-08-31 NOTE — LETTER
September 4, 2018      Nan Mane MD  [address]  [place] [address]           [facility] Epile[facility]  [address]  [place] [address]  Phone: [phone]  Fax: [phone]          Patient: Cesia Stevens   MR Number: [id number]   YOB: 1945   Date of Visit: 8/31/2018       Dear Dr. Nan Mane:    Thank you for referring Cesia Stevens to me for evaluation. Attached you will find relevant portions of my assessment and plan of care.    If you have questions, please do not hesitate to call me. I look forward to following Cesia Stevens along with you.    Sincerely,    Angelo Stokes MD    Enclosure  CC:  No Recipients    If you would like to receive this communication electronically, please contact [email or web address] or [phone] to request more information on [employer] Link access.    For providers and/or their staff who would like to refer a patient to [facility], please contact us through our one-stop-shop provider referral line, [facility], at [phone].    If you feel you have received this communication in error or would no longer like to receive these types of communications, please e-mail [email or web address]

## 2018-08-31 NOTE — PROGRESS NOTES
Subjective:   Patient ID:  Cesia Stevens is a 73 y.o. female who presents for evaluation of Coronary artery disease involving native coronary artery of       HPI:   Cesia Stevenspresents to establish cardiovascular care.Cesia Stevens has known coronary artery disease having had CABG @ 2005 having had prior intervention. States that she has had no further intervention since her CABG. She is wheelchair bound due to a fractured right leg and a history of strokes involving her right side. Echos here and in [facility] Have demonstrated normal LV systolic and diastolic function. She is on Lasix . Cesia Stevens chronic kidney disease and hypertension. Her kidney function has deteriorated and K+ elevated therefore her ACE was stopped. Cesia Stevens denies chest pain, shortness of breath, palpitations, presyncope , or syncope. Cesia Stevens has dyslipidemia  on moderate intensity statin therapy not at goal. Cesia Stevens has diabetes.  Review of Systems   Constitution: Positive for weakness. Negative for malaise/fatigue, weight gain and weight loss.   Eyes: Negative for blurred vision.   Cardiovascular: Negative for chest pain, claudication, cyanosis, dyspnea on exertion, irregular heartbeat, leg swelling, near-syncope, orthopnea, palpitations, paroxysmal nocturnal dyspnea and syncope.   Respiratory: Negative for cough, shortness of breath and wheezing.    Musculoskeletal: Positive for joint pain. Negative for falls and myalgias.   Gastrointestinal: Negative for abdominal pain, heartburn, nausea and vomiting.   Genitourinary: Negative for nocturia.   Neurological: Positive for seizures. Negative for brief paralysis, dizziness, focal weakness, headaches, numbness and paresthesias.        Hx of CVAs   Psychiatric/Behavioral: Negative for altered mental status.       Current Outpatient Medications   Medication Sig    aspirin (ECOTRIN) 81 MG EC tablet Take 81 mg by mouth once daily.    atorvastatin (LIPITOR) 40 MG tablet  "Take 1 tablet (40 mg total) by mouth once daily.    BD ULTRA-FINE MINI PEN NEEDLE 31 gauge x 3/16" Ndle USE FIVE TIMES D    clopidogrel (PLAVIX) 75 mg tablet     donepezil (ARICEPT) 5 MG tablet Take 1 tablet (5 mg total) by mouth every evening.    insulin syringe-needle U-100 1/2 mL 30 gauge Syrg U BID UTD    methocarbamol (ROBAXIN) 750 MG Tab TK 1  T PO HS PRF MUSCLE PAIN    traZODone (DESYREL) 50 MG tablet Take 1 tablet (50 mg total) by mouth every evening.     No current facility-administered medications for this visit.      Objective:   Physical Exam   Constitutional: She is oriented to person, place, and time. She appears well-developed. No distress.   BP (!) 125/57 (BP Location: Left arm, Patient Position: Sitting, BP Method: X-Large (Automatic))   Pulse (!) 51   Ht 5' 4" (1.626 m)   BMI 41.13 kg/m²   Examined in a wheelchair   HENT:   Head: Normocephalic.   Eyes: Conjunctivae are normal. Pupils are equal, round, and reactive to light. No scleral icterus.   Neck: Neck supple. No JVD present. No thyromegaly present.   Cardiovascular: Normal rate, regular rhythm and intact distal pulses. PMI is not displaced. Exam reveals no gallop and no friction rub.   Murmur heard.   Medium-pitched midsystolic murmur is present with a grade of 1/6 at the upper left sternal border.  Pulmonary/Chest: Effort normal and breath sounds normal. No respiratory distress. She has no wheezes. She has no rales.   Median sternotomy scar.   Abdominal: Soft. She exhibits no distension. There is no splenomegaly or hepatomegaly. There is no tenderness.   Musculoskeletal: She exhibits no edema or tenderness.   Wheelchair   Neurological: She is alert and oriented to person, place, and time.   Skin: Skin is warm and dry. She is not diaphoretic.   Psychiatric: She has a normal mood and affect. Her behavior is normal.       Lab Results   Component Value Date     08/22/2018    K 5.7 (H) 08/22/2018     (H) 08/22/2018    CO2 20 " (L) 08/22/2018    BUN 53 (H) 08/22/2018    CREATININE 2.2 (H) 08/22/2018    GLU 87 08/22/2018    HGBA1C 5.5 08/22/2018    AST 16 08/22/2018    ALT 10 08/22/2018    ALBUMIN 3.6 08/22/2018    PROT 6.9 08/22/2018    BILITOT 0.7 08/22/2018    WBC 5.05 08/22/2018    HGB 11.4 (L) 08/22/2018    HCT 33.8 (L) 08/22/2018    MCV 78 (L) 08/22/2018     08/22/2018    TSH 1.624 08/22/2018    CHOL 156 08/22/2018    HDL 41 08/22/2018    LDLCALC 93.6 08/22/2018    TRIG 107 08/22/2018       Assessment:     1. Coronary artery disease involving native coronary artery of native heart without angina pectoris : Stable   2. S/P CABG (coronary artery bypass graft)    3. Essential hypertension : Good control.   4. Hyperkalemia : recently   5. Hyperlipidemia, unspecified hyperlipidemia type : Not at LDL goal   6. Stage 4 chronic kidney disease : Recent worsening   7. History of stroke    8. Type 2 diabetes mellitus with chronic kidney disease, with long-term current use of insulin, unspecified CKD stage    9. Morbid obesity        Plan:     Cesia was seen today for coronary artery disease involving native coronary artery of  and congestive heart failure, unspecified hf chronicity, unspeci.    Diagnoses and all orders for this visit:    Essential hypertension  Continue current regimen    Coronary artery disease involving native coronary artery of native heart without angina pectoris    S/P CABG (coronary artery bypass graft)    Hyperkalemia  ACE stopped  Hyperlipidemia, unspecified hyperlipidemia type  -     atorvastatin (LIPITOR) 40 MG tablet; Take 1 tablet (40 mg total) by mouth once daily.  -     Lipid panel; Future; Expected date: 10/30/2018  Mediterranean Diet recommended with carbohydrate restriction  Stage 4 chronic kidney disease  Stop Furosemide with normal LV function  History of stroke    Type 2 diabetes mellitus with chronic kidney disease, with long-term current use of insulin, unspecified CKD stage    Morbid  Obesity  Mediterranean Diet recommended with carbohydrate restriction

## 2018-08-31 NOTE — PATIENT INSTRUCTIONS
Stp Furosemide  Stop Simvastatin and begin Atorvastatin 40 mg daily  Mediterranean Diet recommended with carbohydrate restriction

## 2018-09-04 PROBLEM — M81.0 AGE-RELATED OSTEOPOROSIS WITHOUT CURRENT PATHOLOGICAL FRACTURE: Status: ACTIVE | Noted: 2018-09-04

## 2018-09-05 ENCOUNTER — TELEPHONE (OUTPATIENT)
Dept: INTERNAL MEDICINE | Facility: CLINIC | Age: 73
End: 2018-09-05

## 2018-09-05 ENCOUNTER — TELEPHONE (OUTPATIENT)
Dept: CARDIOLOGY | Facility: CLINIC | Age: 73
End: 2018-09-05

## 2018-09-05 NOTE — TELEPHONE ENCOUNTER
----- Message from Nan Mane MD sent at 9/4/2018  6:05 PM CDT -----  Bone density shows osteoporosis. We will discuss prescription treatment options at her appt on 9/10. Recommend starting the follow otc now - calcium 7899-6763 mg daily and vitamin D 800-1000 units daily

## 2018-09-05 NOTE — TELEPHONE ENCOUNTER
Cesia Garcia is a pt of Dr. Mike. Joan, nurse with [facility] [[phone]] is calling. Says that she is calling to confirm that Dr. Mike stopped the pt's Lasix.  Says that the pt has 3+ pitting edema of her lower extremities. Says BP is 140/75 HR 50, O2 sat on RA was 99% and lungs were essentially clear. Says that pt is in a w/c and legs are dependent a good bit of the time. Informed nurse that pt Lasix was stopped and atorvastatin was started. Pt had an echo the day before her LV with Dr. Mike on 8-31-18 and that her  LV function was normal, and the pt has worsening kidney disease, and is morbidly obese. Please advise. Thanks, Pat

## 2018-09-05 NOTE — TELEPHONE ENCOUNTER
----- Message from Jennifer Tadeo sent at 9/5/2018  2:23 PM CDT -----  Contact: Joan Horan RN with [facility] [phone]  Joan says the pt informed her that Dr. Mike told her she does not have heart failure and he also took her off her lasix. She says she just need to verify because the pt does seem to be forgetful. The pt also has edema in her lower legs.  LOV 8/31/18 Dr. Mike    Thanks

## 2018-09-06 ENCOUNTER — TELEPHONE (OUTPATIENT)
Dept: CARDIOLOGY | Facility: CLINIC | Age: 73
End: 2018-09-06

## 2018-09-06 NOTE — TELEPHONE ENCOUNTER
Called and spoke with Joan/nurse/[facility]. Gave her the message from Dr. Mike. Advised her to have the pt elevate her legs during the day.

## 2018-09-10 ENCOUNTER — OFFICE VISIT (OUTPATIENT)
Dept: INTERNAL MEDICINE | Facility: CLINIC | Age: 73
End: 2018-09-10
Payer: MEDICARE

## 2018-09-10 ENCOUNTER — LAB VISIT (OUTPATIENT)
Dept: LAB | Facility: HOSPITAL | Age: 73
End: 2018-09-10
Attending: INTERNAL MEDICINE
Payer: MEDICARE

## 2018-09-10 VITALS
OXYGEN SATURATION: 98 % | HEIGHT: 64 IN | DIASTOLIC BLOOD PRESSURE: 60 MMHG | TEMPERATURE: 98 F | HEART RATE: 73 BPM | BODY MASS INDEX: 40.91 KG/M2 | RESPIRATION RATE: 18 BRPM | WEIGHT: 239.63 LBS | SYSTOLIC BLOOD PRESSURE: 118 MMHG

## 2018-09-10 DIAGNOSIS — R60.0 BILATERAL LOWER EXTREMITY EDEMA: ICD-10-CM

## 2018-09-10 DIAGNOSIS — Z79.4 TYPE 2 DIABETES MELLITUS WITH CHRONIC KIDNEY DISEASE, WITH LONG-TERM CURRENT USE OF INSULIN, UNSPECIFIED CKD STAGE: Primary | ICD-10-CM

## 2018-09-10 DIAGNOSIS — E11.22 TYPE 2 DIABETES MELLITUS WITH CHRONIC KIDNEY DISEASE, WITH LONG-TERM CURRENT USE OF INSULIN, UNSPECIFIED CKD STAGE: Primary | ICD-10-CM

## 2018-09-10 DIAGNOSIS — N18.4 STAGE 4 CHRONIC KIDNEY DISEASE: ICD-10-CM

## 2018-09-10 DIAGNOSIS — M81.0 AGE-RELATED OSTEOPOROSIS WITHOUT CURRENT PATHOLOGICAL FRACTURE: ICD-10-CM

## 2018-09-10 DIAGNOSIS — D50.9 MICROCYTIC ANEMIA: ICD-10-CM

## 2018-09-10 DIAGNOSIS — E87.5 HYPERKALEMIA: ICD-10-CM

## 2018-09-10 DIAGNOSIS — K64.9 HEMORRHOIDS, UNSPECIFIED HEMORRHOID TYPE: ICD-10-CM

## 2018-09-10 LAB
25(OH)D3+25(OH)D2 SERPL-MCNC: 12 NG/ML
ALBUMIN SERPL BCP-MCNC: 3.3 G/DL
ALP SERPL-CCNC: 61 U/L
ALT SERPL W/O P-5'-P-CCNC: 8 U/L
ANION GAP SERPL CALC-SCNC: 5 MMOL/L
AST SERPL-CCNC: 15 U/L
BILIRUB SERPL-MCNC: 0.6 MG/DL
BUN SERPL-MCNC: 26 MG/DL
CALCIUM SERPL-MCNC: 9.7 MG/DL
CHLORIDE SERPL-SCNC: 112 MMOL/L
CO2 SERPL-SCNC: 22 MMOL/L
CREAT SERPL-MCNC: 1.6 MG/DL
EST. GFR  (AFRICAN AMERICAN): 37.1 ML/MIN/1.73 M^2
EST. GFR  (NON AFRICAN AMERICAN): 32.2 ML/MIN/1.73 M^2
FERRITIN SERPL-MCNC: 228 NG/ML
GLUCOSE SERPL-MCNC: 130 MG/DL
IRON SERPL-MCNC: 58 UG/DL
POTASSIUM SERPL-SCNC: 5.5 MMOL/L
PROT SERPL-MCNC: 6.3 G/DL
SATURATED IRON: 25 %
SODIUM SERPL-SCNC: 139 MMOL/L
TOTAL IRON BINDING CAPACITY: 235 UG/DL
TRANSFERRIN SERPL-MCNC: 159 MG/DL

## 2018-09-10 PROCEDURE — 99214 OFFICE O/P EST MOD 30 MIN: CPT | Mod: S$PBB,GW,, | Performed by: INTERNAL MEDICINE

## 2018-09-10 PROCEDURE — 99999 PR PBB SHADOW E&M-EST. PATIENT-LVL III: CPT | Mod: PBBFAC,,, | Performed by: INTERNAL MEDICINE

## 2018-09-10 PROCEDURE — 36415 COLL VENOUS BLD VENIPUNCTURE: CPT | Mod: PO

## 2018-09-10 PROCEDURE — 82728 ASSAY OF FERRITIN: CPT

## 2018-09-10 PROCEDURE — 99213 OFFICE O/P EST LOW 20 MIN: CPT | Mod: PBBFAC,PO | Performed by: INTERNAL MEDICINE

## 2018-09-10 PROCEDURE — 80053 COMPREHEN METABOLIC PANEL: CPT

## 2018-09-10 PROCEDURE — 82306 VITAMIN D 25 HYDROXY: CPT

## 2018-09-10 PROCEDURE — 83540 ASSAY OF IRON: CPT

## 2018-09-10 RX ORDER — HYDROCORTISONE 25 MG/G
CREAM TOPICAL
Qty: 30 G | Refills: 0 | Status: SHIPPED | OUTPATIENT
Start: 2018-09-10 | End: 2018-09-20

## 2018-09-10 NOTE — PROGRESS NOTES
"Subjective:       Patient ID: Cesia Stevens is a 73 y.o. female.    Chief Complaint: Follow-up (2 week )    HPI    DM2: She is currently taking Levemir 30 units BID. FBG 88-140s. Highest glucoses are 220s pre-dinner if fast food for lunch (if they are out for a doctor's appt and unable to cook at home) otherwise they are in low 100s range. Her daughter skips the levemir sometimes if glucose are in low-normal range (about every other day will skip one dose).     HTN: lisinopril stopped due to hyperkalemia. Her cardiologist stopped her lasix due as EF was normal.     CKD4: She was instructed to stop NSAID, lisinopril, and lasix. Re-check today.    Hyperkalemia: stopped lisinopril. Avoiding high potassium foods.     Osteoporosis: hasn't started vit d supplement yet.     Review of Systems   Constitutional: Negative for fever.   Respiratory: Negative for cough and shortness of breath.    Cardiovascular: Positive for leg swelling. Negative for chest pain.   Gastrointestinal: Negative for abdominal pain and blood in stool.   Endocrine: Negative for polyuria.   Genitourinary: Negative for difficulty urinating.   Musculoskeletal: Positive for arthralgias and back pain.   Skin: Positive for wound. Negative for rash.   Neurological: Negative for headaches.   Hematological: Negative for adenopathy.       Objective:        Vitals:    09/10/18 1408   BP: 118/60   Pulse: 73   Resp: 18   Temp: 98.1 °F (36.7 °C)   SpO2: 98%   Weight: 108.7 kg (239 lb 10.2 oz)   Height: 5' 4" (1.626 m)       Body mass index is 41.13 kg/m².    Physical Exam   Constitutional: She appears well-developed and well-nourished. No distress.   HENT:   Head: Normocephalic and atraumatic.   Right Ear: External ear normal.   Left Ear: External ear normal.   Nose: Nose normal.   Mouth/Throat: Oropharynx is clear and moist.   Eyes: Conjunctivae and EOM are normal. Right eye exhibits no discharge. Left eye exhibits no discharge.   Neck: Normal range of motion. " Neck supple.   Cardiovascular: Normal rate, regular rhythm, normal heart sounds and intact distal pulses.   Pulmonary/Chest: Effort normal and breath sounds normal. No respiratory distress.   Abdominal: Soft. Bowel sounds are normal. There is no tenderness.   Musculoskeletal: She exhibits edema.   Lymphadenopathy:     She has no cervical adenopathy.   Neurological: She is alert. No cranial nerve deficit.   Skin: Skin is warm and dry. She is not diaphoretic. No erythema.   Psychiatric: She has a normal mood and affect. Her behavior is normal. Thought content normal.       Assessment:     1. Type 2 diabetes mellitus with chronic kidney disease, with long-term current use of insulin, unspecified CKD stage    2. Stage 4 chronic kidney disease    3. Age-related osteoporosis without current pathological fracture    4. Hyperkalemia    5. Microcytic anemia    6. Hemorrhoids, unspecified hemorrhoid type    7. Bilateral lower extremity edema           Plan:         1. Type 2 diabetes mellitus with chronic kidney disease, with long-term current use of insulin, unspecified CKD stage  - decrease detemir to 30u qAM and 20u qHS. Continue to monitor home glucose. Notify clinic if low BG or if persistently elevated.  - insulin detemir U-100 (LEVEMIR) 100 unit/mL injection; Inject 30 Units into the skin once daily AND 20 Units every evening.    2. Stage 4 chronic kidney disease  Avoid nephrotoxic agents.  She has nephrology consult scheduled.  Encouraged her and family to keep that appointment.  - Comprehensive metabolic panel; Future    3. Age-related osteoporosis without current pathological fracture  Will hold off on prescription therapy until more acute issues are resolved.  - Vitamin D; Future    4. Hyperkalemia  Recheck potassium level today after lisinopril stopped.  - Comprehensive metabolic panel; Future    5. Microcytic anemia  She denies signs of bleeding.  - Iron and TIBC; Future  - Ferritin; Future    6. Hemorrhoids,  unspecified hemorrhoid type  - hydrocortisone (ANUSOL-HC) 2.5 % rectal cream; Apply rectally 2 times daily as need for irritation  Dispense: 30 g; Refill: 0    7. Bilateral lower extremity edema  EF normal. Agree with stopping diuretic d/t CKD  Encouraged her to elevate legs.        rtc 3 mo or sooner prn  Handicap parking form completed and given to family.

## 2018-09-11 ENCOUNTER — TELEPHONE (OUTPATIENT)
Dept: INTERNAL MEDICINE | Facility: CLINIC | Age: 73
End: 2018-09-11

## 2018-09-11 DIAGNOSIS — E55.9 VITAMIN D INSUFFICIENCY: ICD-10-CM

## 2018-09-11 DIAGNOSIS — D63.8 ANEMIA OF CHRONIC DISEASE: ICD-10-CM

## 2018-09-11 RX ORDER — ERGOCALCIFEROL 1.25 MG/1
50000 CAPSULE ORAL
Qty: 12 CAPSULE | Refills: 0 | Status: SHIPPED | OUTPATIENT
Start: 2018-09-11 | End: 2018-11-28

## 2018-09-12 ENCOUNTER — OFFICE VISIT (OUTPATIENT)
Dept: NEPHROLOGY | Facility: CLINIC | Age: 73
End: 2018-09-12
Payer: MEDICARE

## 2018-09-12 VITALS
HEART RATE: 77 BPM | OXYGEN SATURATION: 97 % | DIASTOLIC BLOOD PRESSURE: 60 MMHG | BODY MASS INDEX: 49.51 KG/M2 | SYSTOLIC BLOOD PRESSURE: 122 MMHG | HEIGHT: 64 IN | WEIGHT: 290 LBS

## 2018-09-12 DIAGNOSIS — Z79.4 CONTROLLED TYPE 2 DIABETES MELLITUS WITH STAGE 3 CHRONIC KIDNEY DISEASE, WITH LONG-TERM CURRENT USE OF INSULIN: Primary | ICD-10-CM

## 2018-09-12 DIAGNOSIS — N18.30 CONTROLLED TYPE 2 DIABETES MELLITUS WITH STAGE 3 CHRONIC KIDNEY DISEASE, WITH LONG-TERM CURRENT USE OF INSULIN: Primary | ICD-10-CM

## 2018-09-12 DIAGNOSIS — E11.22 CONTROLLED TYPE 2 DIABETES MELLITUS WITH STAGE 3 CHRONIC KIDNEY DISEASE, WITH LONG-TERM CURRENT USE OF INSULIN: Primary | ICD-10-CM

## 2018-09-12 PROCEDURE — 99999 PR PBB SHADOW E&M-EST. PATIENT-LVL III: CPT | Mod: PBBFAC,,, | Performed by: INTERNAL MEDICINE

## 2018-09-12 PROCEDURE — 99204 OFFICE O/P NEW MOD 45 MIN: CPT | Mod: S$PBB,GW,, | Performed by: INTERNAL MEDICINE

## 2018-09-12 PROCEDURE — 99213 OFFICE O/P EST LOW 20 MIN: CPT | Mod: PBBFAC,PO | Performed by: INTERNAL MEDICINE

## 2018-09-12 NOTE — LETTER
September 18, 2018      Nan Mane MD  [address]  [place] [address]           [facility]  [address]  [place] [address]  Phone: [phone]          Patient: Cesia Stevens   MR Number: [id number]   YOB: 1945   Date of Visit: 9/12/2018       Dear Dr. Nan Mane:    Thank you for referring Cesia Stevens to me for evaluation. Attached you will find relevant portions of my assessment and plan of care.    If you have questions, please do not hesitate to call me. I look forward to following Cesia Stevens along with you.    Sincerely,    Donovan Jimenez MD    Enclosure  CC:  No Recipients    If you would like to receive this communication electronically, please contact [email or web address] or [phone] to request more information on [facility] Link access.    For providers and/or their staff who would like to refer a patient to [facility], please contact us through our one-stop-shop provider referral line, [facility], at [phone].    If you feel you have received this communication in error or would no longer like to receive these types of communications, please e-mail [email or web address]

## 2018-09-14 DIAGNOSIS — Z12.11 COLON CANCER SCREENING: ICD-10-CM

## 2018-09-18 ENCOUNTER — TELEPHONE (OUTPATIENT)
Dept: NEPHROLOGY | Facility: CLINIC | Age: 73
End: 2018-09-18

## 2018-09-18 NOTE — PROGRESS NOTES
Subjective:       Patient ID: Cesia Stevens is a 73 y.o. Black or  female who presents for new evaluation of Chronic Kidney Disease    HPI  Ms. Stevens is a 73 year old woman with medical history of diabetes, hypertension, coronary artery disease s/p CABG, stroke w/ residual R sided weakness presenting for evaluation of chronic kidney disease.  Patient accompanied by her daughters who assisted with history.  Patient admitted in June 2018 for seizures, found down at home, noted to have acute kidney injury; creatinine improved prior to discharge to 1.5mg/dL.  Patient creatinine recently noted to be 2.2mg/dL, therefore lisinopril, furosemide and OTC NSAID held per primary MD/Cardiology.  Patient currently denies any fever, chest pain, shortness of breath, abdominal pain, diarrhea, dysuria/hematuria.  Patient reports blood pressure and blood sugars usually well-controlled.  Patient reports moderate fluid intake.      Review of Systems   Constitutional: Negative for appetite change, fatigue and fever.   Respiratory: Negative for chest tightness and shortness of breath.    Cardiovascular: Negative for chest pain and leg swelling.   Gastrointestinal: Negative for abdominal pain, constipation, diarrhea, nausea and vomiting.   Genitourinary: Negative for difficulty urinating, dysuria, flank pain, frequency, hematuria and urgency.   Musculoskeletal: Negative for arthralgias, joint swelling and myalgias.   Skin: Negative for rash and wound.   Neurological: Negative for dizziness, weakness and light-headedness.   All other systems reviewed and are negative.      Objective:      Physical Exam   Constitutional: She appears well-developed and well-nourished.   Cardiovascular: Normal rate, regular rhythm and normal heart sounds. Exam reveals no gallop and no friction rub.   No murmur heard.  Pulmonary/Chest: Effort normal and breath sounds normal. No respiratory distress. She has no wheezes. She has no rales.    Abdominal: Soft. Bowel sounds are normal. There is no tenderness.   Musculoskeletal: She exhibits edema (trace bilaterally).   Neurological: She is alert.   Skin: Skin is warm and dry. No rash noted. No erythema.   Psychiatric: She has a normal mood and affect.   Vitals reviewed.      Assessment:       1. Controlled type 2 diabetes mellitus with stage 3 chronic kidney disease, with long-term current use of insulin        Plan:     Ms. Stevens is a 73 year old woman with medical history of diabetes, hypertension, coronary artery disease s/p CABG, stroke w/ residual R sided weakness presenting for evaluation of chronic kidney disease.  Patient admitted in June 2018 for seizures, found down at home, noted to have acute kidney injury; creatinine improved prior to discharge to 1.5mg/dL.  Patient creatinine recently noted to be 2.2mg/dL, therefore lisinopril, furosemide and OTC NSAID held per primary MD/Cardiology.  Creatinine improved to 1.6mg/dL, will continue to trend.  Suspect patient with baseline CKD stage III, suspect due to diabetic nephropathy (along with age-related renal nephron loss), will recent acute kidney injury resolved with discontinuation of NSAID's (ACEinh/furosemide held as well).  Encouraged continued blood pressure/glycemic control, along with avoidance of NSAID's, to prevent any further progression of kidney disease, patient voiced understanding.   - Hyperkalemia: discussed low potassium diet, given handout, will trend electrolytes  - Anemia: Hgb at goal, no indication for erythropoetin therapy  - Bone/mineral metabolism: will check PTH/VitD    Return to clinic in 3-6 months pending renal panel within a month, then with renal/heme panel, iron/TIBC/ferritin, urinalysis/culture, urine protein/creatinine ratio, PTH/VitD prior to next visit

## 2018-09-18 NOTE — TELEPHONE ENCOUNTER
----- Message from Donovan Jimenez MD sent at 9/18/2018  4:29 PM CDT -----  Please schedule labs in 3 weeks, ordered, thank you.     done

## 2018-09-19 ENCOUNTER — OFFICE VISIT (OUTPATIENT)
Dept: NEUROLOGY | Facility: CLINIC | Age: 73
End: 2018-09-19
Payer: MEDICARE

## 2018-09-19 VITALS
HEIGHT: 64 IN | SYSTOLIC BLOOD PRESSURE: 103 MMHG | HEART RATE: 73 BPM | DIASTOLIC BLOOD PRESSURE: 38 MMHG | BODY MASS INDEX: 49.78 KG/M2

## 2018-09-19 DIAGNOSIS — R41.3 MEMORY DIFFICULTY: Primary | ICD-10-CM

## 2018-09-19 DIAGNOSIS — I63.9 CEREBROVASCULAR ACCIDENT (CVA), UNSPECIFIED MECHANISM: ICD-10-CM

## 2018-09-19 DIAGNOSIS — R56.9 SEIZURE: ICD-10-CM

## 2018-09-19 PROCEDURE — 99213 OFFICE O/P EST LOW 20 MIN: CPT | Mod: PBBFAC | Performed by: PSYCHIATRY & NEUROLOGY

## 2018-09-19 PROCEDURE — 99999 PR PBB SHADOW E&M-EST. PATIENT-LVL III: CPT | Mod: PBBFAC,,, | Performed by: PSYCHIATRY & NEUROLOGY

## 2018-09-19 PROCEDURE — 99214 OFFICE O/P EST MOD 30 MIN: CPT | Mod: GW,S$PBB,HPC,ICN | Performed by: PSYCHIATRY & NEUROLOGY

## 2018-09-19 RX ORDER — DONEPEZIL HYDROCHLORIDE 10 MG/1
10 TABLET, FILM COATED ORAL NIGHTLY
Qty: 30 TABLET | Refills: 11 | Status: SHIPPED | OUTPATIENT
Start: 2018-09-19 | End: 2019-09-19

## 2018-09-19 NOTE — LETTER
September 20, 2018      Angelo Stokes MD  [address]  [facility] [address]           Eldridge Gini  Neurology  [address]  [facility] [address]  Phone: [phone]  Fax: [phone]          Patient: Cesia Stevens   MR Number: [id number]   YOB: 1945   Date of Visit: 9/19/2018       Dear Dr. Angelo Stokes:    Thank you for referring Cesia Stevens to me for evaluation. Attached you will find relevant portions of my assessment and plan of care.    If you have questions, please do not hesitate to call me. I look forward to following Cesia Stevens along with you.    Sincerely,    Keven Boone MD    Enclosure  CC:  No Recipients    If you would like to receive this communication electronically, please contact [email or web address] or [phone] to request more information on [facility] access.    For providers and/or their staff who would like to refer a patient to [facility], please contact us through our one-stop-shop provider referral line, [facility], at [phone].    If you feel you have received this communication in error or would no longer like to receive these types of communications, please e-mail [email or web address]
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
## 2018-09-21 RX ORDER — LANOLIN ALCOHOL/MO/W.PET/CERES
1000 CREAM (GRAM) TOPICAL DAILY
Qty: 90 TABLET | Refills: 3 | Status: SHIPPED | OUTPATIENT
Start: 2018-09-21

## 2018-09-24 ENCOUNTER — OFFICE VISIT (OUTPATIENT)
Dept: WOUND CARE | Facility: CLINIC | Age: 73
End: 2018-09-24
Payer: OTHER MISCELLANEOUS

## 2018-09-24 ENCOUNTER — INITIAL CONSULT (OUTPATIENT)
Dept: OPHTHALMOLOGY | Facility: CLINIC | Age: 73
End: 2018-09-24
Payer: MEDICARE

## 2018-09-24 VITALS
HEIGHT: 64 IN | HEART RATE: 73 BPM | BODY MASS INDEX: 50.02 KG/M2 | RESPIRATION RATE: 18 BRPM | TEMPERATURE: 97 F | DIASTOLIC BLOOD PRESSURE: 69 MMHG | SYSTOLIC BLOOD PRESSURE: 146 MMHG | WEIGHT: 293 LBS

## 2018-09-24 DIAGNOSIS — S91.301D OPEN WOUND OF RIGHT FOOT, SUBSEQUENT ENCOUNTER: Primary | ICD-10-CM

## 2018-09-24 DIAGNOSIS — Z79.4 TYPE 2 DIABETES MELLITUS WITH CHRONIC KIDNEY DISEASE, WITH LONG-TERM CURRENT USE OF INSULIN, UNSPECIFIED CKD STAGE: ICD-10-CM

## 2018-09-24 DIAGNOSIS — E11.22 TYPE 2 DIABETES MELLITUS WITH CHRONIC KIDNEY DISEASE, WITH LONG-TERM CURRENT USE OF INSULIN, UNSPECIFIED CKD STAGE: ICD-10-CM

## 2018-09-24 DIAGNOSIS — E11.3513 CONTROLLED TYPE 2 DIABETES MELLITUS WITH BOTH EYES AFFECTED BY PROLIFERATIVE RETINOPATHY AND MACULAR EDEMA, WITH LONG-TERM CURRENT USE OF INSULIN: Primary | ICD-10-CM

## 2018-09-24 DIAGNOSIS — E66.01 MORBID OBESITY: ICD-10-CM

## 2018-09-24 DIAGNOSIS — H43.812 POSTERIOR VITREOUS DETACHMENT, LEFT: ICD-10-CM

## 2018-09-24 DIAGNOSIS — H35.372 EPIRETINAL MEMBRANE, LEFT: ICD-10-CM

## 2018-09-24 DIAGNOSIS — Z79.4 CONTROLLED TYPE 2 DIABETES MELLITUS WITH BOTH EYES AFFECTED BY PROLIFERATIVE RETINOPATHY AND MACULAR EDEMA, WITH LONG-TERM CURRENT USE OF INSULIN: Primary | ICD-10-CM

## 2018-09-24 PROCEDURE — 11042 DBRDMT SUBQ TIS 1ST 20SQCM/<: CPT | Mod: PBBFAC | Performed by: NURSE PRACTITIONER

## 2018-09-24 PROCEDURE — 11042 DBRDMT SUBQ TIS 1ST 20SQCM/<: CPT | Mod: GW,S$PBB,ICN, | Performed by: NURSE PRACTITIONER

## 2018-09-24 PROCEDURE — 92225 PR SPECIAL EYE EXAM, INITIAL: CPT | Mod: GW,S$PBB,LT,ICN | Performed by: OPHTHALMOLOGY

## 2018-09-24 PROCEDURE — 99999 PR PBB SHADOW E&M-EST. PATIENT-LVL II: CPT | Mod: PBBFAC,,, | Performed by: OPHTHALMOLOGY

## 2018-09-24 PROCEDURE — 99212 OFFICE O/P EST SF 10 MIN: CPT | Mod: 25,GW,S$PBB,ICN | Performed by: NURSE PRACTITIONER

## 2018-09-24 PROCEDURE — 92225 PR SPECIAL EYE EXAM, INITIAL: CPT | Mod: PBBFAC,LT | Performed by: OPHTHALMOLOGY

## 2018-09-24 PROCEDURE — 92134 CPTRZ OPH DX IMG PST SGM RTA: CPT | Mod: PBBFAC | Performed by: OPHTHALMOLOGY

## 2018-09-24 PROCEDURE — 99999 PR PBB SHADOW E&M-EST. PATIENT-LVL IV: CPT | Mod: PBBFAC,,, | Performed by: NURSE PRACTITIONER

## 2018-09-24 PROCEDURE — 99212 OFFICE O/P EST SF 10 MIN: CPT | Mod: PBBFAC | Performed by: OPHTHALMOLOGY

## 2018-09-24 PROCEDURE — 92014 COMPRE OPH EXAM EST PT 1/>: CPT | Mod: GW,S$PBB,ICN, | Performed by: OPHTHALMOLOGY

## 2018-09-24 PROCEDURE — 99214 OFFICE O/P EST MOD 30 MIN: CPT | Mod: PBBFAC,27,25 | Performed by: NURSE PRACTITIONER

## 2018-09-24 NOTE — PROGRESS NOTES
Subjective:       Patient ID: Cesia Stevens is a 73 y.o. female.    Chief Complaint: Wound Check    Mrs. Stevens is a 73 year old female who was living in [place] with her . She injured her right foot on a nail. Treatment was received at the time but she can not  recall what the treatment was. She verbalized being up to date on her tetnus immunization. Following the injury, her family noticed that she was not caring for herself properly due to multiple medical conditions that included a history of CVA with right sided weakness. She and her  were brought to live with a daughter who now assumes care for her. The daughter reports  being admitted to a palliative care service but then states that she will be discharged from that service soon. The patient will be admitted to a home health service. Current topical treatment is collagen. The wound appears to be closed howecer it has  a large callous build up to the area. Healing delayed and complicated by DM Type 2 insulin dependent.      Review of Systems   Constitutional: Negative for chills, diaphoresis, fatigue and fever.   HENT: Negative.  Negative for ear pain, nosebleeds, sinus pain, sore throat and trouble swallowing.    Eyes: Positive for visual disturbance. Negative for pain and redness.   Respiratory: Positive for cough. Negative for shortness of breath and wheezing.         Reports hx of asthma   Cardiovascular: Negative for chest pain and palpitations.        Hx of CHF, Hx of MI, Hx of CABG, Hx of CVA   Gastrointestinal: Negative.  Negative for abdominal distention, abdominal pain, blood in stool, nausea and vomiting.   Endocrine: Negative for polydipsia, polyphagia and polyuria.        Type 2 DM insulin dependent   Genitourinary: Negative.  Negative for flank pain and hematuria.   Musculoskeletal: Positive for arthralgias (knees). Negative for back pain, joint swelling and myalgias.        Torn rotator cuff right shoulder   Skin: Positive  for wound.   Allergic/Immunologic: Negative.    Neurological: Positive for seizures (One seizure in June, daughter states from UTI. Medication discontinued today. ) and weakness (right sided weakness from CVA, general debility). Negative for facial asymmetry and headaches.   Hematological: Negative.  Negative for adenopathy. Does not bruise/bleed easily.   Psychiatric/Behavioral: Positive for confusion (dementia). Negative for agitation, dysphoric mood and sleep disturbance. The patient is not nervous/anxious.        Objective:      Physical Exam   Constitutional: She is oriented to person, place, and time. Vital signs are normal. She appears well-developed. No distress.   HENT:   Head: Normocephalic.   Mouth/Throat: Oropharynx is clear and moist.   Eyes: Conjunctivae, EOM and lids are normal. Pupils are equal, round, and reactive to light.   Neck: Trachea normal and normal range of motion. Carotid bruit is not present. No thyromegaly present.   Cardiovascular: Normal rate, normal heart sounds and intact distal pulses. An irregular rhythm present.   Pulses:       Popliteal pulses are 0 on the right side, and 0 on the left side.        Dorsalis pedis pulses are 1+ on the right side, and 0 on the left side.        Posterior tibial pulses are 1+ on the right side.   Pulmonary/Chest: Effort normal and breath sounds normal. No respiratory distress. She has no wheezes. She has no rales.   Abdominal: Soft. Bowel sounds are normal. She exhibits no distension. There is no tenderness.   Musculoskeletal:        Right shoulder: She exhibits swelling (BLE).        Right foot: There is decreased range of motion.        Feet:    Feet:   Right Foot:   Protective Sensation: 5 sites tested. 3 sites sensed.   Skin Integrity: Positive for ulcer, callus and dry skin.   Left Foot:   Protective Sensation: 0 sites tested.   Lymphadenopathy:     She has no cervical adenopathy.   Neurological: She is alert and oriented to person, place, and  time.   Skin: Skin is warm and dry. Capillary refill takes 2 to 3 seconds. She is not diaphoretic. No erythema.   Psychiatric: She has a normal mood and affect. Thought content normal.   Vitals reviewed.              Procedure: Cesia was seen in the clinic room and placed in the supine position on the treatment table. Attention was directed to the Right 5th metatarsal head. The wound was covered with 100%  callused rim. No acute signs of infection were noted. The wound was non-tender. The wound was prepped with betadine. Utilizing a 3mm curette, I debrided the subcutaneous tissue to excise viable tissue inside the wound margins. Inside the calloused was a large amount of macerated, devitalized skin surrounding the wound and covering the wound bed. The patient tolerated well. Because of a lack of sensation, anesthesia was not necessary. The wound was flushed with wound cleanser There was minimal bleeding with debridement which was well controlled with direct pressure. The wound and sinus tract was filled with alginate to control maceration followed by a border gauze dressing. The patient tolerated the procedure well. Demonstrated dressing change technique to daughter present.   Lab Results   Component Value Date    HGBA1C 5.5 08/22/2018     Lab Results   Component Value Date    ALBUMIN 3.3 (L) 09/10/2018     Assessment:       1. Open wound of right foot, subsequent encounter    2. Type 2 diabetes mellitus with chronic kidney disease, with long-term current use of insulin, unspecified CKD stage    3. Morbid obesity        Plan:       Clean wound and sinus tract with normal saline  Apply alginate to sinus track and wound cavity  Cover with a border gauze  Change every other day  Return to clinic in 2-3 weeks  Monitor for signs of infection

## 2018-09-24 NOTE — PATIENT INSTRUCTIONS
Irrigate wound and sinus tract with Normal saline syringe  Fill wound cavity and sinus tract with alginate  Cover with border dressing  Perform every other day  Monitor for signs and symptoms of infection    Return to clinic in 2-3 weeks

## 2018-09-24 NOTE — PROGRESS NOTES
HPI     Dls: 8/30/18 dr carcamo    Referred for diabetic retinopathy    -vision is stable  +itchy prn  -no flashes or floaters  +art tears 1-2 x day    IDDM  Hemoglobin A1C       Date                     Value               Ref Range             Status                08/22/2018               5.5                 4.0 - 5.6 %           Final                 OCT - No ME OD  ERM OS      A/P    1. PDR OU  T2 controlled on insulin  S/p PRP OU - lighter tx OS    2. DME OD  S/p focal OD  Stable    3. ERM OS    4. PVD OS    5. PCIOL OU    BS/BP/chol control      6 months OCT/[place] FA OS

## 2018-09-26 ENCOUNTER — HOSPITAL ENCOUNTER (OUTPATIENT)
Dept: NEUROLOGY | Facility: CLINIC | Age: 73
Discharge: HOME | End: 2018-09-26
Payer: MEDICARE

## 2018-09-26 ENCOUNTER — HOSPITAL ENCOUNTER (OUTPATIENT)
Dept: RADIOLOGY | Facility: HOSPITAL | Age: 73
Discharge: HOME | End: 2018-09-26
Attending: PSYCHIATRY & NEUROLOGY
Payer: MEDICARE

## 2018-09-26 DIAGNOSIS — R41.3 MEMORY DIFFICULTY: ICD-10-CM

## 2018-09-26 PROCEDURE — 95957 EEG DIGITAL ANALYSIS: CPT | Mod: PBBFAC | Performed by: PSYCHIATRY & NEUROLOGY

## 2018-09-26 PROCEDURE — 70551 MRI BRAIN STEM W/O DYE: CPT | Mod: TC

## 2018-09-26 PROCEDURE — 95816 EEG AWAKE AND DROWSY: CPT | Mod: PBBFAC | Performed by: PSYCHIATRY & NEUROLOGY

## 2018-09-26 PROCEDURE — 70551 MRI BRAIN STEM W/O DYE: CPT | Mod: 26,GW,, | Performed by: RADIOLOGY

## 2018-09-26 PROCEDURE — 95816 EEG AWAKE AND DROWSY: CPT | Mod: 26,S$PBB,GV, | Performed by: PSYCHIATRY & NEUROLOGY

## 2018-10-09 ENCOUNTER — LAB VISIT (OUTPATIENT)
Dept: LAB | Facility: HOSPITAL | Age: 73
End: 2018-10-09
Attending: INTERNAL MEDICINE
Payer: MEDICARE

## 2018-10-09 DIAGNOSIS — N18.30 CONTROLLED TYPE 2 DIABETES MELLITUS WITH STAGE 3 CHRONIC KIDNEY DISEASE, WITH LONG-TERM CURRENT USE OF INSULIN: ICD-10-CM

## 2018-10-09 DIAGNOSIS — Z79.4 CONTROLLED TYPE 2 DIABETES MELLITUS WITH STAGE 3 CHRONIC KIDNEY DISEASE, WITH LONG-TERM CURRENT USE OF INSULIN: ICD-10-CM

## 2018-10-09 DIAGNOSIS — E11.22 CONTROLLED TYPE 2 DIABETES MELLITUS WITH STAGE 3 CHRONIC KIDNEY DISEASE, WITH LONG-TERM CURRENT USE OF INSULIN: ICD-10-CM

## 2018-10-09 LAB
ALBUMIN SERPL BCP-MCNC: 3 G/DL
ANION GAP SERPL CALC-SCNC: 6 MMOL/L
BUN SERPL-MCNC: 15 MG/DL
CALCIUM SERPL-MCNC: 9.5 MG/DL
CHLORIDE SERPL-SCNC: 105 MMOL/L
CO2 SERPL-SCNC: 26 MMOL/L
CREAT SERPL-MCNC: 1.4 MG/DL
EST. GFR  (AFRICAN AMERICAN): 43.6 ML/MIN/1.73 M^2
EST. GFR  (NON AFRICAN AMERICAN): 37.8 ML/MIN/1.73 M^2
GLUCOSE SERPL-MCNC: 143 MG/DL
PHOSPHATE SERPL-MCNC: 2.8 MG/DL
POTASSIUM SERPL-SCNC: 4.2 MMOL/L
SODIUM SERPL-SCNC: 137 MMOL/L

## 2018-10-09 PROCEDURE — 36415 COLL VENOUS BLD VENIPUNCTURE: CPT | Mod: PO

## 2018-10-09 PROCEDURE — 80069 RENAL FUNCTION PANEL: CPT

## 2018-10-15 ENCOUNTER — OFFICE VISIT (OUTPATIENT)
Dept: PODIATRY | Facility: CLINIC | Age: 73
End: 2018-10-15
Payer: MEDICARE

## 2018-10-15 VITALS
DIASTOLIC BLOOD PRESSURE: 78 MMHG | HEART RATE: 78 BPM | SYSTOLIC BLOOD PRESSURE: 181 MMHG | WEIGHT: 293 LBS | HEIGHT: 65 IN | BODY MASS INDEX: 48.82 KG/M2

## 2018-10-15 DIAGNOSIS — E11.22 TYPE 2 DIABETES MELLITUS WITH CHRONIC KIDNEY DISEASE, WITH LONG-TERM CURRENT USE OF INSULIN, UNSPECIFIED CKD STAGE: Primary | ICD-10-CM

## 2018-10-15 DIAGNOSIS — L60.9 DISEASE OF NAIL: ICD-10-CM

## 2018-10-15 DIAGNOSIS — Z86.73 HISTORY OF STROKE: ICD-10-CM

## 2018-10-15 DIAGNOSIS — E66.01 MORBID OBESITY: ICD-10-CM

## 2018-10-15 DIAGNOSIS — Z79.4 TYPE 2 DIABETES MELLITUS WITH CHRONIC KIDNEY DISEASE, WITH LONG-TERM CURRENT USE OF INSULIN, UNSPECIFIED CKD STAGE: Primary | ICD-10-CM

## 2018-10-15 DIAGNOSIS — L97.512 SKIN ULCER OF RIGHT FOOT WITH FAT LAYER EXPOSED: ICD-10-CM

## 2018-10-15 PROCEDURE — 99203 OFFICE O/P NEW LOW 30 MIN: CPT | Mod: S$PBB,25,, | Performed by: PODIATRIST

## 2018-10-15 PROCEDURE — 11721 DEBRIDE NAIL 6 OR MORE: CPT | Mod: 59,Q9,S$PBB, | Performed by: PODIATRIST

## 2018-10-15 PROCEDURE — 11042 DBRDMT SUBQ TIS 1ST 20SQCM/<: CPT | Mod: S$PBB,,, | Performed by: PODIATRIST

## 2018-10-15 PROCEDURE — 11721 DEBRIDE NAIL 6 OR MORE: CPT | Mod: Q9,PBBFAC | Performed by: PODIATRIST

## 2018-10-15 PROCEDURE — 99213 OFFICE O/P EST LOW 20 MIN: CPT | Mod: PBBFAC,25 | Performed by: PODIATRIST

## 2018-10-15 PROCEDURE — 11042 DBRDMT SUBQ TIS 1ST 20SQCM/<: CPT | Mod: PBBFAC | Performed by: PODIATRIST

## 2018-10-15 PROCEDURE — 99999 PR PBB SHADOW E&M-EST. PATIENT-LVL III: CPT | Mod: PBBFAC,,, | Performed by: PODIATRIST

## 2018-10-15 NOTE — PROGRESS NOTES
Subjective:      Patient ID: Cesia Stevens is a 73 y.o. female.    Chief Complaint: Diabetes Mellitus (dr mane 09/10/2018) and Diabetic Foot Exam    Cesia is a 73 y.o. female who presents to the clinic for evaluation and treatment of high risk feet. Cesia has a past medical history of Arthritis, CHF (congestive heart failure), Coronary artery disease, Diabetes mellitus, type 2, Disorder of kidney and ureter, Hyperlipidemia, Hypertension, Seizures, and Stroke. The patient's chief complaint is long, thick toenails AND foot r foot ulcer x several weks. She wasseen by wound care and recently went on hospice for chf. Reports d/c from hospie due to inability to see specialist .  This patient has documented high risk feet requiring routine maintenance secondary to diabetes mellitis and those secondary complications of diabetes, as mentioned..    PCP: Nan Mane MD    Date Last Seen by PCP:   Chief Complaint   Patient presents with    Diabetes Mellitus     dr mane 09/10/2018    Diabetic Foot Exam             Hemoglobin A1C   Date Value Ref Range Status   08/22/2018 5.5 4.0 - 5.6 % Final     Comment:     ADA Screening Guidelines:  5.7-6.4%  Consistent with prediabetes  >or=6.5%  Consistent with diabetes  High levels of fetal hemoglobin interfere with the HbA1C  assay. Heterozygous hemoglobin variants (HbS, HgC, etc)do  not significantly interfere with this assay.   However, presence of multiple variants may affect accuracy.               Patient Active Problem List   Diagnosis    Congestive heart failure    Depressive disorder    Type 2 diabetes mellitus with kidney complication, with long-term current use of insulin    Hyperlipidemia    Essential hypertension    Seizure    History of stroke    Chronic kidney disease    Microcytic anemia    Hyperkalemia    Open wound of right foot    Coronary artery disease involving native coronary artery of native heart without angina pectoris    S/P CABG (coronary  "artery bypass graft)    Morbid obesity    Age-related osteoporosis without current pathological fracture    Bilateral lower extremity edema    Hemorrhoids    Vitamin D insufficiency    Anemia of chronic disease    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      atorvastatin (LIPITOR) 40 MG tablet Take 1 tablet (40 mg total) by mouth once daily. 30 tablet 11    BD ULTRA-FINE MINI PEN NEEDLE 31 gauge x 3/16" Ndle USE FIVE TIMES D  2    clopidogrel (PLAVIX) 75 mg tablet   1    cyanocobalamin (VITAMIN B-12) 1000 MCG tablet Take 1 tablet (1,000 mcg total) by mouth once daily. 90 tablet 3    donepezil (ARICEPT) 10 MG tablet Take 1 tablet (10 mg total) by mouth every evening. 30 tablet 11    ergocalciferol (ERGOCALCIFEROL) 50,000 unit Cap Take 1 capsule (50,000 Units total) by mouth every 7 days. for 12 doses 12 capsule 0    insulin detemir U-100 (LEVEMIR) 100 unit/mL injection Inject 30 Units into the skin once daily AND 20 Units every evening.      insulin syringe-needle U-100 1/2 mL 30 gauge Syrg U BID UTD  2    methocarbamol (ROBAXIN) 750 MG Tab TK 1  T PO HS PRF MUSCLE PAIN  2    traZODone (DESYREL) 50 MG tablet Take 1 tablet (50 mg total) by mouth every evening. 30 tablet 11     No current facility-administered medications on file prior to visit.        Review of patient's allergies indicates:  No Known Allergies    Past Surgical History:   Procedure Laterality Date    CORONARY ARTERY BYPASS GRAFT      HYSTERECTOMY      PERIPHERAL ARTERIAL STENT GRAFT         History reviewed. No pertinent family history.    Social History     Socioeconomic History    Marital status: " "Single     Spouse name: Not on file    Number of children: Not on file    Years of education: Not on file    Highest education level: Not on file   Social Needs    Financial resource strain: Not on file    Food insecurity - worry: Not on file    Food insecurity - inability: Not on file    Transportation needs - medical: Not on file    Transportation needs - non-medical: Not on file   Occupational History    Not on file   Tobacco Use    Smoking status: Never Smoker    Smokeless tobacco: Never Used   Substance and Sexual Activity    Alcohol use: No     Frequency: Never    Drug use: No    Sexual activity: No   Other Topics Concern    Not on file   Social History Narrative    Not on file           Review of Systems   Constitution: Negative for chills, decreased appetite and fever.   Cardiovascular: Positive for leg swelling (chronic).   Skin: Positive for color change (light areas to extremities), dry skin, nail changes and poor wound healing (r foot ulcer ).   Musculoskeletal: Positive for arthritis and joint pain. Negative for joint swelling and myalgias.   Gastrointestinal: Negative for nausea and vomiting.   Neurological: Positive for loss of balance and numbness. Negative for paresthesias.           Objective:       Vitals:    10/15/18 1121   BP: (!) 181/78   Pulse: 78   Weight: 133.4 kg (294 lb)   Height: 5' 4.8" (1.646 m)   PainSc:   2        Physical Exam   Constitutional: She is oriented to person, place, and time. She appears well-developed and well-nourished.   Cardiovascular:   Dorsalis pedis and posterior tibial pulses are diminished Bilaterally. Toes are cool to touch. Feet are warm proximally.There is decreased digital hair. Skin is atrophic, slightly hyperpigmented, and mildly edematous       Musculoskeletal: Normal range of motion. She exhibits no edema or tenderness.   Adequate joint range of motion without pain, limitation, nor crepitation Bilateral feet and ankle joints. Muscle " strength is 5/5 in all groups bilaterally.         Neurological: She is alert and oriented to person, place, and time.   [place]-Roni 5.07 monofilamant testing is diminished Varun feet. Sharp/dull sensation diminished Bilaterally. Light touch absent Bilaterally.       Skin: Skin is warm and dry. No ecchymosis and no lesion noted. No erythema.   Nails x 10  are elongated by  2-4mm's, thickened by 1-2 mm's, dystrophic, and are whtish in  coloration . Xerosis Bilaterally. No open lesions noted.    Ulcer location: lateral r foot   Pre debridement Measurements: 0.5x0.2x0.1 cm   Post debridement Measurements : 1.6x0.6x0.4  cm   Signs of infection: none  Drainage: serous   Periwound: hyperkeratotic and macerated   Base: granular      Psychiatric: She has a normal mood and affect. Her behavior is normal.             Assessment:       Encounter Diagnoses   Name Primary?    Type 2 diabetes mellitus with chronic kidney disease, with long-term current use of insulin, unspecified CKD stage Yes    History of stroke     Morbid obesity     Skin ulcer of right foot with fat layer exposed     Disease of nail          Plan:       Cesia was seen today for diabetes mellitus and diabetic foot exam.    Diagnoses and all orders for this visit:    Type 2 diabetes mellitus with chronic kidney disease, with long-term current use of insulin, unspecified CKD stage    History of stroke    Morbid obesity    Skin ulcer of right foot with fat layer exposed    Disease of nail      I counseled the patient on her conditions, their implications and medical management.        - Shoe inspection. Diabetic Foot Education. Patient reminded of the importance of good nutrition and blood sugar control to help prevent podiatric complications of diabetes. Patient instructed on proper foot hygeine. We discussed wearing proper shoe gear, daily foot inspections, never walking without protective shoe gear, never putting sharp instruments to feet, routine  podiatric nail visits every 2-3 months.      - With patient's permission, nails were aggressively reduced and debrided x 10 to their soft tissue attachment mechanically and with electric , removing all offending nail and debris. Patient relates relief following the procedure. She will continue to monitor the areas daily, inspect her feet, wear protective shoe gear when ambulatory, moisturizer to maintain skin integrity         - per granddaughter family is scheduling [facility] appt     - Debridement:Nonviable tissues were debrided beyond the level of  sub Q utilizing a  sterile No. 15 scalpel and forceps. Minimal bleeding controlled with direct pressure  The patient tolerated this well.     Dressings:reese   Offloading:aayush Rivas MA assisted w/ application of football dressing under my direct supervision. Darco shoe applied to offload the area. Advised patient that this should be worn on the affected foot at all times when ambulating       Follow-up:Patient is to return to the clinic in one week for follow-up but should call [facility] immediately if any signs of infection, such as fever, chills, sweats, increased redness or pain.    Short-term goals include maintaining good offloading and minimizing bioburden, promoting granulation and epithelialization to healing.  Long-term goals include keeping the wound healed by good offloading and medical management under the direction of internist.

## 2018-10-24 ENCOUNTER — OFFICE VISIT (OUTPATIENT)
Dept: PODIATRY | Facility: CLINIC | Age: 73
End: 2018-10-24
Payer: MEDICARE

## 2018-10-24 VITALS
HEIGHT: 64 IN | BODY MASS INDEX: 49.85 KG/M2 | TEMPERATURE: 98 F | HEART RATE: 75 BPM | RESPIRATION RATE: 18 BRPM | DIASTOLIC BLOOD PRESSURE: 69 MMHG | SYSTOLIC BLOOD PRESSURE: 165 MMHG | WEIGHT: 292 LBS

## 2018-10-24 DIAGNOSIS — Z79.4 TYPE 2 DIABETES MELLITUS WITH CHRONIC KIDNEY DISEASE, WITH LONG-TERM CURRENT USE OF INSULIN, UNSPECIFIED CKD STAGE: Primary | ICD-10-CM

## 2018-10-24 DIAGNOSIS — E11.22 TYPE 2 DIABETES MELLITUS WITH CHRONIC KIDNEY DISEASE, WITH LONG-TERM CURRENT USE OF INSULIN, UNSPECIFIED CKD STAGE: Primary | ICD-10-CM

## 2018-10-24 DIAGNOSIS — L97.512 SKIN ULCER OF RIGHT FOOT WITH FAT LAYER EXPOSED: ICD-10-CM

## 2018-10-24 DIAGNOSIS — R53.1 WEAKNESS: ICD-10-CM

## 2018-10-24 DIAGNOSIS — Z86.73 HISTORY OF STROKE: ICD-10-CM

## 2018-10-24 PROCEDURE — 11042 DBRDMT SUBQ TIS 1ST 20SQCM/<: CPT | Mod: S$PBB,,, | Performed by: PODIATRIST

## 2018-10-24 PROCEDURE — 99999 PR PBB SHADOW E&M-EST. PATIENT-LVL III: CPT | Mod: PBBFAC,,, | Performed by: PODIATRIST

## 2018-10-24 PROCEDURE — 99499 UNLISTED E&M SERVICE: CPT | Mod: S$PBB,,, | Performed by: PODIATRIST

## 2018-10-24 PROCEDURE — 11042 DBRDMT SUBQ TIS 1ST 20SQCM/<: CPT | Mod: PBBFAC | Performed by: PODIATRIST

## 2018-10-24 PROCEDURE — 99213 OFFICE O/P EST LOW 20 MIN: CPT | Mod: PBBFAC | Performed by: PODIATRIST

## 2018-10-24 NOTE — PROGRESS NOTES
Subjective:      Patient ID: Cesia Stevens is a 73 y.o. female.    Chief Complaint: Follow-up (wound check ); Foot Ulcer; and Dressing Change    Cesia is a 73 y.o. female who presents to the clinic for evaluation and treatment of high risk feet. Cesia has a past medical history of Arthritis, CHF (congestive heart failure), Coronary artery disease, Diabetes mellitus, type 2, Disorder of kidney and ureter, Hyperlipidemia, Hypertension, Seizures, and Stroke. The patient's chief complaint is long, thick toenails AND foot r foot ulcer x several weks. She wasseen by wound care and recently went on hospice for chf. Reports d/c from hospie due to inability to see specialist .  This patient has documented high risk feet requiring routine maintenance secondary to diabetes mellitis and those secondary complications of diabetes, as mentioned..      10/24/18: Patient has been in football dressing, ambulating in Darco shoe x 1 week with out issues     PCP: Nan Mane MD    Date Last Seen by PCP:   Chief Complaint   Patient presents with    Follow-up     wound check     Foot Ulcer    Dressing Change             Hemoglobin A1C   Date Value Ref Range Status   08/22/2018 5.5 4.0 - 5.6 % Final     Comment:     ADA Screening Guidelines:  5.7-6.4%  Consistent with prediabetes  >or=6.5%  Consistent with diabetes  High levels of fetal hemoglobin interfere with the HbA1C  assay. Heterozygous hemoglobin variants (HbS, HgC, etc)do  not significantly interfere with this assay.   However, presence of multiple variants may affect accuracy.               Patient Active Problem List   Diagnosis    Congestive heart failure    Depressive disorder    Type 2 diabetes mellitus with kidney complication, with long-term current use of insulin    Hyperlipidemia    Essential hypertension    Seizure    History of stroke    Chronic kidney disease    Microcytic anemia    Hyperkalemia    Open wound of right foot    Coronary artery disease  "involving native coronary artery of native heart without angina pectoris    S/P CABG (coronary artery bypass graft)    Morbid obesity    Age-related osteoporosis without current pathological fracture    Bilateral lower extremity edema    Hemorrhoids    Vitamin D insufficiency    Anemia of chronic disease    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      atorvastatin (LIPITOR) 40 MG tablet Take 1 tablet (40 mg total) by mouth once daily. 30 tablet 11    BD ULTRA-FINE MINI PEN NEEDLE 31 gauge x 3/16" Ndle USE FIVE TIMES D  2    clopidogrel (PLAVIX) 75 mg tablet   1    cyanocobalamin (VITAMIN B-12) 1000 MCG tablet Take 1 tablet (1,000 mcg total) by mouth once daily. 90 tablet 3    donepezil (ARICEPT) 10 MG tablet Take 1 tablet (10 mg total) by mouth every evening. 30 tablet 11    ergocalciferol (ERGOCALCIFEROL) 50,000 unit Cap Take 1 capsule (50,000 Units total) by mouth every 7 days. for 12 doses 12 capsule 0    insulin detemir U-100 (LEVEMIR) 100 unit/mL injection Inject 30 Units into the skin once daily AND 20 Units every evening.      insulin syringe-needle U-100 1/2 mL 30 gauge Syrg U BID UTD  2    methocarbamol (ROBAXIN) 750 MG Tab TK 1  T PO HS PRF MUSCLE PAIN  2    traZODone (DESYREL) 50 MG tablet Take 1 tablet (50 mg total) by mouth every evening. 30 tablet 11     No current facility-administered medications on file prior to visit.        Review of patient's allergies indicates:  No Known Allergies    Past Surgical History:   Procedure Laterality Date    CORONARY ARTERY BYPASS GRAFT      HYSTERECTOMY      PERIPHERAL ARTERIAL STENT GRAFT         No family " "history on file.    Social History     Socioeconomic History    Marital status: Single     Spouse name: Not on file    Number of children: Not on file    Years of education: Not on file    Highest education level: Not on file   Social Needs    Financial resource strain: Not on file    Food insecurity - worry: Not on file    Food insecurity - inability: Not on file    Transportation needs - medical: Not on file    Transportation needs - non-medical: Not on file   Occupational History    Not on file   Tobacco Use    Smoking status: Never Smoker    Smokeless tobacco: Never Used   Substance and Sexual Activity    Alcohol use: No     Frequency: Never    Drug use: No    Sexual activity: No   Other Topics Concern    Not on file   Social History Narrative    Not on file           Review of Systems   Constitution: Negative for chills, decreased appetite and fever.   Cardiovascular: Positive for leg swelling (chronic).   Skin: Positive for color change (light areas to extremities), dry skin, nail changes and poor wound healing (r foot ulcer ).   Musculoskeletal: Positive for arthritis and joint pain. Negative for joint swelling and myalgias.   Gastrointestinal: Negative for nausea and vomiting.   Neurological: Positive for loss of balance and numbness. Negative for paresthesias.           Objective:       Vitals:    10/24/18 1118   BP: (!) 165/69   Pulse: 75   Resp: 18   Temp: 98.4 °F (36.9 °C)   TempSrc: Oral   Weight: 132.5 kg (292 lb)   Height: 5' 4" (1.626 m)   PainSc:   3   PainLoc: Foot        Physical Exam   Constitutional: She is oriented to person, place, and time. She appears well-developed and well-nourished.   Cardiovascular:   Dorsalis pedis and posterior tibial pulses are diminished Bilaterally. Toes are cool to touch. Feet are warm proximally.There is decreased digital hair. Skin is atrophic, slightly hyperpigmented, and mildly edematous       Musculoskeletal: Normal range of motion. She " exhibits no edema or tenderness.   Adequate joint range of motion without pain, limitation, nor crepitation Bilateral feet and ankle joints. Muscle strength is 5/5 in all groups bilaterally.         Neurological: She is alert and oriented to person, place, and time.   [place]-Roni 5.07 monofilamant testing is diminished Varun feet. Sharp/dull sensation diminished Bilaterally. Light touch absent Bilaterally.       Skin: Skin is warm and dry. No ecchymosis noted. No erythema.   Nails x 10  are elongated by  2-4mm's, thickened by 1-2 mm's, dystrophic, and are whtish in  coloration . Xerosis Bilaterally. No open lesions noted.    Ulcer location: lateral r foot   Pre debridement Measurements: 0.2x0.2x0.1cm   Post debridement Measurements : 0.6x0.3x0.1 cm   Signs of infection: none  Drainage: serous   Periwound: hyperkeratotic   Base: granular      Psychiatric: She has a normal mood and affect. Her behavior is normal.   Vitals reviewed.            Assessment:       Encounter Diagnoses   Name Primary?    Type 2 diabetes mellitus with chronic kidney disease, with long-term current use of insulin, unspecified CKD stage Yes    History of stroke     Skin ulcer of right foot with fat layer exposed     Weakness          Plan:       Cesia was seen today for follow-up, foot ulcer and dressing change.    Diagnoses and all orders for this visit:    Type 2 diabetes mellitus with chronic kidney disease, with long-term current use of insulin, unspecified CKD stage    History of stroke    Skin ulcer of right foot with fat layer exposed    Weakness  -     CANE FOR HOME USE      I counseled the patient on her conditions, their implications and medical management.      Debridement:Nonviable tissues were debrided beyond the level of  sub Q utilizing a  sterile No. 15 scalpel and forceps. Minimal bleeding controlled with direct pressure  The patient tolerated this well.     Dressings:reese   Offloading:aayush Rivas MA  assisted w/ application of football dressing under my direct supervision. Darco shoe applied to offload the area. Advised patient that this should be worn on the affected foot at all times when ambulating       Follow-up:Patient is to return to the clinic in one week for follow-up but should call [facility] immediately if any signs of infection, such as fever, chills, sweats, increased redness or pain.    Short-term goals include maintaining good offloading and minimizing bioburden, promoting granulation and epithelialization to healing.  Long-term goals include keeping the wound healed by good offloading and medical management under the direction of internist.

## 2018-10-29 ENCOUNTER — TELEPHONE (OUTPATIENT)
Dept: NEUROLOGY | Facility: CLINIC | Age: 73
End: 2018-10-29

## 2018-10-29 ENCOUNTER — TELEPHONE (OUTPATIENT)
Dept: INTERNAL MEDICINE | Facility: CLINIC | Age: 73
End: 2018-10-29

## 2018-10-29 DIAGNOSIS — Z86.73 HISTORY OF STROKE: ICD-10-CM

## 2018-10-29 DIAGNOSIS — I10 ESSENTIAL HYPERTENSION: Primary | ICD-10-CM

## 2018-10-29 RX ORDER — AMLODIPINE BESYLATE 5 MG/1
5 TABLET ORAL DAILY
Qty: 30 TABLET | Refills: 11 | Status: SHIPPED | OUTPATIENT
Start: 2018-10-29 | End: 2019-10-29

## 2018-10-29 NOTE — TELEPHONE ENCOUNTER
----- Message from Taco Case sent at 10/29/2018 12:25 PM CDT -----  Test Results    Type of Test: EEG  Date of Test: 09/26/18  Communication Preference: Vicki (daughter) @ [phone]  Additional Information:

## 2018-10-29 NOTE — TELEPHONE ENCOUNTER
Commode and wheelchair ordered, please fax.   Do not resume lisinopril as this caused an elevated potassium level. New medication for HTN sent to pharmacy- amlodipine. Continue to monitor home BP.

## 2018-10-29 NOTE — TELEPHONE ENCOUNTER
Spoke with the patient granddaughter and she stated that she fell twice today trying to get up on her own. Patient has been having some memory issues, patient has been seeing neurology. Granddaughter wanted to know if she can get an order  bedside commode and electric wheelchair too be sent to [facility]. Patient granddaughter stated that her blood pressure has been elevated. Her readings has been 10/22- 174/68, 10/16//63, 10//68.  Patient isn't currently taking any blood pressure medication. They wanted to know if she should start back taking lisinopril 20 mg?

## 2018-10-29 NOTE — PROCEDURES
EEG REPORT      Cesia Stevens  [id number]  1945    DATE OF SERVICE: 9/26/2018    METHODOLOGY      Extended electroencephalographic recording is made while the patient is ambulatory and continuing normal daily activities.  Electrodes are placed according to the International 10-20 placement system and included T1 and T2 electrode placement.  Twenty four (24) channels of digital signal (sampling rate of 512/sec) was simultaneously recorded from the scalp including EKG and eye monitors.  Recording band pass was 0.1 to 100 hz and all data was stored digitally on the recorder.  The patient is instructed to press an event button when clinical symptoms occur and write the symptoms into a diary. Activation procedures which include photic stimulation, hyperventilation and instructing patients to perform simple task are done in selected patients.        The EEG is displayed on a monitor screen and can be reformatted into different montages for evaluation.  The entire recoding is submitted for computer assisted analysis to detect spike and electrographic seizure activity.  The entire recording is visually reviewed and the times identified by computer analysis as being spikes or seizures are reviewed again.  Compresses spectral analysis (CSA) is also performed on the activity recorded from each individual channel.  This is displayed as a power display of frequencies from 0 to 30 Hz over time.   The CSA analysis is done and displayed continuously.  This is reviewed for asymmetries in power between homologous areas of the scalp and for presence of changes in power which canbe seen when seizures occur.  Sections of suspected abnormalities on the CSA is then compared with the original EEG recording.  .     Mobifusion software was also utilized in the review of this study.  This software suite analyzes the EEG recording in multiple domains.  Coherence and rhythmicity is computed to identify EEG sections which may contain organized  seizures.  Each channel undergoes analysis to detect presence of spike and sharp waves which have special and morphological characteristic of epileptic activity.  The routine EEG recording is converted from spacial into frequency domain.  This is then displayed comparing homologous areas to identify areas of significant asymmetry.  Algorithm to identify non-cortically generated artifact is used to separate eye movement, EMG and other artifact from the EEG     Recording Times  A total of 00:25:47 of EEG was recorded.      EEG FINDINGS:    Background activity:   The background rhythm was characterized alpha and anterior dominant beta activity with a 9Hz posterior dominant alpha rhythm at 30-70 microvolts.     Symmetry and continuity: there was an intermittent delta activity in the left temporal leads.     Sleep:   Patient became drowsy with increased theta frequencies and loss of PDR, no sleep transients were recorded.    Activation procedures:   Hyperventilation was performed with no abnormalities seen  Photic stimulation was performed with no abnormalities seen    Abnormal activity:   No epileptiform discharges, periodic discharges, lateralized rhythmic delta activity or electrographic seizures were seen.      IMPRESSION: none  This is an abnormal awake and drowsy EEG due to the finding of non-specific cerebral dysfunction in the left temporal region.  There is no indication of seizure tendency.    Keven Boone MD  Neurology-Epilepsy.  [facility].

## 2018-10-29 NOTE — TELEPHONE ENCOUNTER
----- Message from Mango Dickey sent at 10/29/2018 12:15 PM CDT -----  Contact: Lashon Grand Daughter / [phone] or Lena Mccloud   Patient has fallen 2 times this month and 2 of those falls were today, Patients granddaughter would like to know if they can do anything in the house to further prevent these falls. The patient also hasn't received the results for her MRI or EKG and family would like those results soon to see how they can better assist the patient.Please Advise.

## 2018-10-29 NOTE — TELEPHONE ENCOUNTER
Informed the patient granddaughter of recommendations. I will fax over information to [facility] for wheelchair and bedside commode.

## 2018-11-01 ENCOUNTER — TELEPHONE (OUTPATIENT)
Dept: PODIATRY | Facility: CLINIC | Age: 73
End: 2018-11-01

## 2018-11-01 ENCOUNTER — OFFICE VISIT (OUTPATIENT)
Dept: PODIATRY | Facility: CLINIC | Age: 73
End: 2018-11-01
Payer: MEDICARE

## 2018-11-01 ENCOUNTER — TELEPHONE (OUTPATIENT)
Dept: NEUROLOGY | Facility: CLINIC | Age: 73
End: 2018-11-01

## 2018-11-01 VITALS
HEART RATE: 88 BPM | DIASTOLIC BLOOD PRESSURE: 80 MMHG | RESPIRATION RATE: 18 BRPM | SYSTOLIC BLOOD PRESSURE: 151 MMHG | BODY MASS INDEX: 50.02 KG/M2 | HEIGHT: 64 IN | WEIGHT: 293 LBS

## 2018-11-01 DIAGNOSIS — Z87.2 HEALED ULCER OF RIGHT FOOT ON EXAMINATION: ICD-10-CM

## 2018-11-01 DIAGNOSIS — Z86.73 HISTORY OF STROKE: ICD-10-CM

## 2018-11-01 DIAGNOSIS — R53.1 WEAKNESS: ICD-10-CM

## 2018-11-01 DIAGNOSIS — Z79.4 TYPE 2 DIABETES MELLITUS WITH CHRONIC KIDNEY DISEASE, WITH LONG-TERM CURRENT USE OF INSULIN, UNSPECIFIED CKD STAGE: Primary | ICD-10-CM

## 2018-11-01 DIAGNOSIS — E11.22 TYPE 2 DIABETES MELLITUS WITH CHRONIC KIDNEY DISEASE, WITH LONG-TERM CURRENT USE OF INSULIN, UNSPECIFIED CKD STAGE: Primary | ICD-10-CM

## 2018-11-01 PROCEDURE — 99213 OFFICE O/P EST LOW 20 MIN: CPT | Mod: S$PBB,,, | Performed by: PODIATRIST

## 2018-11-01 PROCEDURE — 99999 PR PBB SHADOW E&M-EST. PATIENT-LVL IV: CPT | Mod: PBBFAC,,, | Performed by: PODIATRIST

## 2018-11-01 PROCEDURE — 99214 OFFICE O/P EST MOD 30 MIN: CPT | Mod: PBBFAC | Performed by: PODIATRIST

## 2018-11-01 NOTE — TELEPHONE ENCOUNTER
Spoke with granddaughter-she will be able to get grandmother to [facility] for 2:45 appt today, appt made

## 2018-11-01 NOTE — TELEPHONE ENCOUNTER
----- Message from Addis Townsend sent at 11/1/2018  9:02 AM CDT -----  Contact: Grand-daughter/ [phone]  Patient Requesting Sooner Appointment.     Reason for sooner appt.: F/u on her foot.   Communication Preference: Cell phone [phone].

## 2018-11-02 DIAGNOSIS — Z12.39 BREAST CANCER SCREENING: ICD-10-CM

## 2018-11-04 NOTE — PROGRESS NOTES
Subjective:      Patient ID: Cesia Stevens is a 73 y.o. female.    Chief Complaint: Follow-up (wound check); Foot Ulcer; and Dressing Change    Cesia is a 73 y.o. female who presents to the clinic for evaluation and treatment of high risk feet. Cesia has a past medical history of Arthritis, CHF (congestive heart failure), Coronary artery disease, Diabetes mellitus, type 2, Disorder of kidney and ureter, Hyperlipidemia, Hypertension, Seizures, and Stroke. The patient's chief complaint is long, thick toenails AND foot r foot ulcer x several weks. She wasseen by wound care and recently went on hospice for chf. Reports d/c from hospie due to inability to see specialist .  This patient has documented high risk feet requiring routine maintenance secondary to diabetes mellitis and those secondary complications of diabetes, as mentioned..      10/24/18: Patient has been in football dressing, ambulating in Darco shoe x 1 week with out issues   11/4/18: Patient has been in football dressing, ambulating in Dar[place] shoe x 1 week with out issues       PCP: Nan Mane MD    Date Last Seen by PCP:   Chief Complaint   Patient presents with    Follow-up     wound check    Foot Ulcer    Dressing Change             Hemoglobin A1C   Date Value Ref Range Status   08/22/2018 5.5 4.0 - 5.6 % Final     Comment:     ADA Screening Guidelines:  5.7-6.4%  Consistent with prediabetes  >or=6.5%  Consistent with diabetes  High levels of fetal hemoglobin interfere with the HbA1C  assay. Heterozygous hemoglobin variants (HbS, HgC, etc)do  not significantly interfere with this assay.   However, presence of multiple variants may affect accuracy.               Patient Active Problem List   Diagnosis    Congestive heart failure    Depressive disorder    Type 2 diabetes mellitus with kidney complication, with long-term current use of insulin    Hyperlipidemia    Essential hypertension    Seizure    History of stroke    Chronic kidney  "disease    Microcytic anemia    Hyperkalemia    Open wound of right foot    Coronary artery disease involving native coronary artery of native heart without angina pectoris    S/P CABG (coronary artery bypass graft)    Morbid obesity    Age-related osteoporosis without current pathological fracture    Bilateral lower extremity edema    Hemorrhoids    Vitamin D insufficiency    Anemia of chronic disease    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 30 tablet 11    aspirin (ECOTRIN) 81 MG EC tablet Take 81 mg by mouth once daily.      atorvastatin (LIPITOR) 40 MG tablet Take 1 tablet (40 mg total) by mouth once daily. 30 tablet 11    BD ULTRA-FINE MINI PEN NEEDLE 31 gauge x 3/16" Ndle USE FIVE TIMES D  2    clopidogrel (PLAVIX) 75 mg tablet   1    cyanocobalamin (VITAMIN B-12) 1000 MCG tablet Take 1 tablet (1,000 mcg total) by mouth once daily. 90 tablet 3    donepezil (ARICEPT) 10 MG tablet Take 1 tablet (10 mg total) by mouth every evening. 30 tablet 11    ergocalciferol (ERGOCALCIFEROL) 50,000 unit Cap Take 1 capsule (50,000 Units total) by mouth every 7 days. for 12 doses 12 capsule 0    insulin detemir U-100 (LEVEMIR) 100 unit/mL injection Inject 30 Units into the skin once daily AND 20 Units every evening.      insulin syringe-needle U-100 1/2 mL 30 gauge Syrg U BID UTD  2    methocarbamol (ROBAXIN) 750 MG Tab TK 1  T PO HS PRF MUSCLE PAIN  2    traZODone (DESYREL) 50 MG tablet Take 1 tablet (50 mg total) by mouth every evening. 30 tablet 11     No current facility-administered medications on file prior to visit.        Review of patient's " "allergies indicates:  No Known Allergies    Past Surgical History:   Procedure Laterality Date    CORONARY ARTERY BYPASS GRAFT      HYSTERECTOMY      PERIPHERAL ARTERIAL STENT GRAFT         No family history on file.    Social History     Socioeconomic History    Marital status: Single     Spouse name: Not on file    Number of children: Not on file    Years of education: Not on file    Highest education level: Not on file   Social Needs    Financial resource strain: Not on file    Food insecurity - worry: Not on file    Food insecurity - inability: Not on file    Transportation needs - medical: Not on file    Transportation needs - non-medical: Not on file   Occupational History    Not on file   Tobacco Use    Smoking status: Never Smoker    Smokeless tobacco: Never Used   Substance and Sexual Activity    Alcohol use: No     Frequency: Never    Drug use: No    Sexual activity: No   Other Topics Concern    Not on file   Social History Narrative    Not on file           Review of Systems   Constitution: Negative for chills, decreased appetite and fever.   Cardiovascular: Positive for leg swelling (chronic).   Skin: Positive for color change (light areas to extremities), dry skin, nail changes and poor wound healing (r foot ulcer ). Negative for flushing and itching.   Musculoskeletal: Positive for arthritis and joint pain. Negative for joint swelling and myalgias.   Gastrointestinal: Negative for nausea and vomiting.   Neurological: Positive for loss of balance and numbness. Negative for paresthesias.           Objective:       Vitals:    11/01/18 1534   BP: (!) 151/80   Pulse: 88   Resp: 18   Weight: 132.9 kg (293 lb)   Height: 5' 4" (1.626 m)   PainSc: 0-No pain        Physical Exam   Constitutional: She is oriented to person, place, and time. She appears well-developed and well-nourished.   Cardiovascular:   Dorsalis pedis and posterior tibial pulses are diminished Bilaterally. Toes are cool to " touch. Feet are warm proximally.There is decreased digital hair. Skin is atrophic, slightly hyperpigmented, and mildly edematous       Musculoskeletal: Normal range of motion. She exhibits no edema or tenderness.   Adequate joint range of motion without pain, limitation, nor crepitation Bilateral feet and ankle joints. Muscle strength is 5/5 in all groups bilaterally.         Neurological: She is alert and oriented to person, place, and time.   [place]oni 5.07 monofilamant testing is diminished Varun feet. Sharp/dull sensation diminished Bilaterally. Light touch absent Bilaterally.       Skin: Skin is warm, dry and intact. No ecchymosis and no lesion noted. No erythema.     Ulcer location: lateral r foot    well healed , epithelial tissues intact..No surrounding erythema, edema, malodor, nor drainage noted      Psychiatric: She has a normal mood and affect. Her behavior is normal.   Vitals reviewed.            Assessment:       Encounter Diagnoses   Name Primary?    Type 2 diabetes mellitus with chronic kidney disease, with long-term current use of insulin, unspecified CKD stage Yes    History of stroke     Weakness     Healed ulcer of right foot on examination          Plan:       Cesia was seen today for follow-up, foot ulcer and dressing change.    Diagnoses and all orders for this visit:    Type 2 diabetes mellitus with chronic kidney disease, with long-term current use of insulin, unspecified CKD stage    History of stroke    Weakness    Healed ulcer of right foot on examination      I counseled the patient on her conditions, their implications and medical management.    Wound has healed well with no signs of continued skin breakdown noted   Ok to transition into normal shoe gear at this time. I advised patient to check feet daily for signs of drainage or lesion re-opening   Discussed use of daily foot moisturizer to feet, avoiding the webspace's  Limit showers/bathing to roughly 15 minutes during the  1st few weeks after wound has healed to prevent skin breakdown

## 2018-11-05 ENCOUNTER — PATIENT MESSAGE (OUTPATIENT)
Dept: INTERNAL MEDICINE | Facility: CLINIC | Age: 73
End: 2018-11-05

## 2018-11-12 ENCOUNTER — PATIENT MESSAGE (OUTPATIENT)
Dept: INTERNAL MEDICINE | Facility: CLINIC | Age: 73
End: 2018-11-12

## 2018-11-12 ENCOUNTER — PATIENT MESSAGE (OUTPATIENT)
Dept: PODIATRY | Facility: CLINIC | Age: 73
End: 2018-11-12

## 2018-11-12 DIAGNOSIS — E11.22 TYPE 2 DIABETES MELLITUS WITH CHRONIC KIDNEY DISEASE, WITH LONG-TERM CURRENT USE OF INSULIN, UNSPECIFIED CKD STAGE: ICD-10-CM

## 2018-11-12 DIAGNOSIS — Z79.4 TYPE 2 DIABETES MELLITUS WITH CHRONIC KIDNEY DISEASE, WITH LONG-TERM CURRENT USE OF INSULIN, UNSPECIFIED CKD STAGE: ICD-10-CM

## 2018-11-12 RX ORDER — PEN NEEDLE, DIABETIC 31 GX5/16"
NEEDLE, DISPOSABLE MISCELLANEOUS
Qty: 100 EACH | Refills: 11 | Status: SHIPPED | OUTPATIENT
Start: 2018-11-12

## 2018-11-13 NOTE — TELEPHONE ENCOUNTER
I called the patient grand daughter concerning orders, I faxed over paper work to [facility] for commode, but they don't do power wheelchairs. I informed the patient daughter that I will have to find someone that order power wheel chairs and keep her informed.

## 2018-11-14 ENCOUNTER — TELEPHONE (OUTPATIENT)
Dept: INTERNAL MEDICINE | Facility: CLINIC | Age: 73
End: 2018-11-14

## 2018-11-14 ENCOUNTER — PATIENT MESSAGE (OUTPATIENT)
Dept: INTERNAL MEDICINE | Facility: CLINIC | Age: 73
End: 2018-11-14

## 2018-11-14 ENCOUNTER — OFFICE VISIT (OUTPATIENT)
Dept: NEUROLOGY | Facility: CLINIC | Age: 73
End: 2018-11-14
Payer: MEDICARE

## 2018-11-14 DIAGNOSIS — I50.9 CONGESTIVE HEART FAILURE, UNSPECIFIED HF CHRONICITY, UNSPECIFIED HEART FAILURE TYPE: ICD-10-CM

## 2018-11-14 DIAGNOSIS — E66.01 MORBID OBESITY: ICD-10-CM

## 2018-11-14 DIAGNOSIS — I25.10 CORONARY ARTERY DISEASE INVOLVING NATIVE CORONARY ARTERY OF NATIVE HEART WITHOUT ANGINA PECTORIS: ICD-10-CM

## 2018-11-14 DIAGNOSIS — Z86.73 HISTORY OF STROKE: Primary | ICD-10-CM

## 2018-11-14 DIAGNOSIS — Z79.4 TYPE 2 DIABETES MELLITUS WITH CHRONIC KIDNEY DISEASE, WITH LONG-TERM CURRENT USE OF INSULIN, UNSPECIFIED CKD STAGE: ICD-10-CM

## 2018-11-14 DIAGNOSIS — Z86.73 HISTORY OF STROKE: ICD-10-CM

## 2018-11-14 DIAGNOSIS — F32.A DEPRESSIVE DISORDER: ICD-10-CM

## 2018-11-14 DIAGNOSIS — I10 ESSENTIAL HYPERTENSION: ICD-10-CM

## 2018-11-14 DIAGNOSIS — E11.22 TYPE 2 DIABETES MELLITUS WITH CHRONIC KIDNEY DISEASE, WITH LONG-TERM CURRENT USE OF INSULIN, UNSPECIFIED CKD STAGE: ICD-10-CM

## 2018-11-14 DIAGNOSIS — E53.8 B12 DEFICIENCY: ICD-10-CM

## 2018-11-14 DIAGNOSIS — F01.518 VASCULAR DEMENTIA WITH BEHAVIOR DISTURBANCE: Primary | ICD-10-CM

## 2018-11-14 DIAGNOSIS — E78.5 HYPERLIPIDEMIA, UNSPECIFIED HYPERLIPIDEMIA TYPE: ICD-10-CM

## 2018-11-14 PROCEDURE — 96118 PR NEUROPSYCH TESTING BY PSYCH/PHYS: CPT | Mod: PBBFAC,59 | Performed by: PSYCHIATRY & NEUROLOGY

## 2018-11-14 PROCEDURE — 99999 PR PBB SHADOW E&M-EST. PATIENT-LVL II: CPT | Mod: PBBFAC,,,

## 2018-11-14 PROCEDURE — 96118 PR NEUROPSYCH TESTING BY PSYCH/PHYS: CPT | Mod: S$PBB,,, | Performed by: PSYCHIATRY & NEUROLOGY

## 2018-11-14 PROCEDURE — 99215 OFFICE O/P EST HI 40 MIN: CPT | Mod: S$PBB,,, | Performed by: PSYCHIATRY & NEUROLOGY

## 2018-11-14 PROCEDURE — 99212 OFFICE O/P EST SF 10 MIN: CPT | Mod: PBBFAC

## 2018-11-14 NOTE — PATIENT INSTRUCTIONS
It is important that you take your medicines as prescribed. These will help with control of blood pressure, cholesterol, blood sugar and prevention of another stroke.     You were also prescribed B12 supplement daily. This will also help support memory.

## 2018-11-14 NOTE — PROGRESS NOTES
Name: Cesia Stevens  MRN: [id number]   CSN: [id number]      Date: 11-14-18      Referring physician:  Keven Boone MD  [address] [place] [address]    Subjective:      Chief Complaint: memory    History of Present Illness (HPI):    Cesia Stevens is a 73 y.o.  female with CVA ho presents today for an initial evaluation of dementia- vascular vs Alzheimer's in the [facility] and is accompanied by patient and grand daughter. She was referred by Dr. Boone after he saw her 9-19-18 for possible seizure. MRI and EEG obtained as well as labs and was discovered to have B12 deficiency.       Granddaughter was living out of town but moved back in June 2018 to help with her care. At that time, she was having crying spells and was talking about dead relatives. She brings up traumatic past life events.   She is not sure when all of this started. She says her grandmother was living in [place] with her 90+ yo . She did not feel they needed to be living alone. They have been resistant of help from others. Mrs. Stevens was in and out of the hospital the past year.  and Mrs Stevens are now living their daughter. It is anticipated that they will be transitioning into a senior apt in January. This will be closer to additional family members and they will check on daily and take meals.     She feels the memory pill has worked a little bit for her grandmother.     In past 6 mos, she has noted her grandmother is more negative, has trust issues. When she first moved in with her dtr, she thought they were poisoning her. She is paranoid and thinks their room is wired with recording and listening devices.Granddaughter says they do have a home security system with cameras but none in the bedrooms. She thinks the night light is bugged.     Does not take naps. She sleeps lightly.     She has not driven since June.    Helps fold clothes and sweeps.   She will clean off her plate after meals.   She  "is sundowning, not nightly.     Granddaughter sets up her meds. Mrs. Stevens is confrontational about her meds. She will not take them if they watch her. Again, she thinks they are poisoning her. Occ will find a pill on the floor.   Seems to get worse if she has been in the house more.   Sometimes she can dress on her own, sometimes cannot.  will help. She will not ask for help.   They do bed baths with her. Due to her limited mobility, she cannot get in tub. She does not walk.   With bathing, she will assist if they tell her what to do. Bath time has not been confrontational.     Granddaugther manages her docCovacsiss appts. She and Mrs. Stevens dtr manage the money.     She is taking B12 gummies for B12 deficiency.         Review of Systems   Constitutional: Negative for appetite change.   HENT: Negative for trouble swallowing.    Musculoskeletal: Positive for gait problem.   Psychiatric/Behavioral: Negative for sleep disturbance.       Past Medical History: The patient  has a past medical history of Arthritis, CHF (congestive heart failure), Coronary artery disease, Diabetes mellitus, type 2, Disorder of kidney and ureter, Hyperlipidemia, Hypertension, Seizures, and Stroke.    Social History: The patient  reports that  has never smoked. she has never used smokeless tobacco. She reports that she does not drink alcohol or use drugs.    Family History: Their family history is not on file.    Allergies: Patient has no known allergies.     Meds:   Current Outpatient Medications on File Prior to Visit   Medication Sig Dispense Refill    amLODIPine (NORVASC) 5 MG tablet Take 1 tablet (5 mg total) by mouth once daily. 30 tablet 11    aspirin (ECOTRIN) 81 MG EC tablet Take 81 mg by mouth once daily.      atorvastatin (LIPITOR) 40 MG tablet Take 1 tablet (40 mg total) by mouth once daily. 30 tablet 11    BD ULTRA-FINE MINI PEN NEEDLE 31 gauge x 3/16" Ndle USE FIVE TIMES D 100 each 11    clopidogrel (PLAVIX) 75 mg " "tablet   1    cyanocobalamin (VITAMIN B-12) 1000 MCG tablet Take 1 tablet (1,000 mcg total) by mouth once daily. 90 tablet 3    donepezil (ARICEPT) 10 MG tablet Take 1 tablet (10 mg total) by mouth every evening. 30 tablet 11    ergocalciferol (ERGOCALCIFEROL) 50,000 unit Cap Take 1 capsule (50,000 Units total) by mouth every 7 days. for 12 doses 12 capsule 0    insulin detemir U-100 (LEVEMIR) 100 unit/mL injection Inject 30 Units into the skin once daily AND 20 Units every evening. 15 mL 3    insulin syringe-needle U-100 1/2 mL 30 gauge Syrg U BID UTD  2    methocarbamol (ROBAXIN) 750 MG Tab TK 1  T PO HS PRF MUSCLE PAIN  2    traZODone (DESYREL) 50 MG tablet Take 1 tablet (50 mg total) by mouth every evening. 30 tablet 11     No current facility-administered medications on file prior to visit.        Objective:     Physical Exam:  Constitutional  Overweight, well-developed, appears stated age, appropriately groomed and dressed     ..  Neurological    * Mental status    - Orientation Oriented to: person, place, situation and that next week is Thanksgiving.      - Memory     Impaired but talks about how they are leaving for [place] (although does not recall when) to see her other daughter (granddaugher's mother). She does not know how long she will be there but is excited to go.      - Attention/concentration  Easily distracted     - Language  Spontaneous, fluent, talkative     ..  * Cranial nerves       - CN II  PERRL, visual fields full to confrontation     - CN III, IV, VI  Extraocular movements full, normal pursuits and saccades     - CN V  Sensation V1 - V3 intact     - CN VII  Face strong and symmetric bilaterally     - CN VIII  Hearing intact bilaterally     - CN IX, X  Palate raises midline and symmetric     - CN XI  Does not attempt R shoulder shrug due to fractured R shoulder- Left shoulder shrug 5/5      - CN XII  Tongue midline   * Motor  R side deferred due "broken shoulder and femur" (past, " still painful) Will move RUE some but does not lift as fully as left. Does participate in NATALIE- which are normal  Power L side normal.    * Sensory   Intact to temperature and vibration throughout   * Coordination  No dysmetria with finger-to-nose    * Gait  WC     ..  * Specialized movement exam  No hypophonic speech.    No facial masking.   No cogwheel rigidity.     No bradykinesia.   No tremor with rest, posture, kinesis, or intention.    No other dystonia, chorea, athetosis, myoclonus, or tics.           Laboratory Results:  Lab Visit on 10/09/2018   Component Date Value Ref Range Status    Glucose 10/09/2018 143* 70 - 110 mg/dL Final    Sodium 10/09/2018 137  136 - 145 mmol/L Final    Potassium 10/09/2018 4.2  3.5 - 5.1 mmol/L Final    Chloride 10/09/2018 105  95 - 110 mmol/L Final    CO2 10/09/2018 26  23 - 29 mmol/L Final    BUN, Bld 10/09/2018 15  8 - 23 mg/dL Final    Calcium 10/09/2018 9.5  8.7 - 10.5 mg/dL Final    Creatinine 10/09/2018 1.4  0.5 - 1.4 mg/dL Final    Albumin 10/09/2018 3.0* 3.5 - 5.2 g/dL Final    Phosphorus 10/09/2018 2.8  2.7 - 4.5 mg/dL Final    eGFR if  10/09/2018 43.6* >60 mL/min/1.73 m^2 Final    eGFR if non African American 10/09/2018 37.8* >60 mL/min/1.73 m^2 Final    Anion Gap 10/09/2018 6* 8 - 16 mmol/L Final   Lab Visit on 09/19/2018   Component Date Value Ref Range Status    Vitamin B-12 09/19/2018 203* 210 - 950 pg/mL Final    Thiamine 09/19/2018 43  38 - 122 ug/L Final   Lab Visit on 09/10/2018   Component Date Value Ref Range Status    Sodium 09/10/2018 139  136 - 145 mmol/L Final    Potassium 09/10/2018 5.5* 3.5 - 5.1 mmol/L Final    Chloride 09/10/2018 112* 95 - 110 mmol/L Final    CO2 09/10/2018 22* 23 - 29 mmol/L Final    Glucose 09/10/2018 130* 70 - 110 mg/dL Final    BUN, Bld 09/10/2018 26* 8 - 23 mg/dL Final    Creatinine 09/10/2018 1.6* 0.5 - 1.4 mg/dL Final    Calcium 09/10/2018 9.7  8.7 - 10.5 mg/dL Final    Total Protein  09/10/2018 6.3  6.0 - 8.4 g/dL Final    Albumin 09/10/2018 3.3* 3.5 - 5.2 g/dL Final    Total Bilirubin 09/10/2018 0.6  0.1 - 1.0 mg/dL Final    Alkaline Phosphatase 09/10/2018 61  55 - 135 U/L Final    AST 09/10/2018 15  10 - 40 U/L Final    ALT 09/10/2018 8* 10 - 44 U/L Final    Anion Gap 09/10/2018 5* 8 - 16 mmol/L Final    eGFR if  09/10/2018 37.1* >60 mL/min/1.73 m^2 Final    eGFR if non African American 09/10/2018 32.2* >60 mL/min/1.73 m^2 Final    Iron 09/10/2018 58  30 - 160 ug/dL Final    Transferrin 09/10/2018 159* 200 - 375 mg/dL Final    TIBC 09/10/2018 235* 250 - 450 ug/dL Final    Saturated Iron 09/10/2018 25  20 - 50 % Final    Ferritin 09/10/2018 228  20.0 - 300.0 ng/mL Final    Vit D, 25-Hydroxy 09/10/2018 12* 30 - 96 ng/mL Final   Clinical Support on 08/30/2018   Component Date Value Ref Range Status    QEF 08/30/2018 60  55 - 65 Final    Diastolic Dysfunction 08/30/2018 No   Final    Est. PA Systolic Pressure 08/30/2018 16.4   Final    Mitral Valve Mobility 08/30/2018 NORMAL   Final   Lab Visit on 08/24/2018   Component Date Value Ref Range Status    Specimen UA 08/24/2018 Urine, Unspecified   Final    Color, UA 08/24/2018 Yellow  Yellow, Straw, Alem Final    Appearance, UA 08/24/2018 Cloudy* Clear Final    pH, UA 08/24/2018 5.0  5.0 - 8.0 Final    Specific Gravity, UA 08/24/2018 1.020  1.005 - 1.030 Final    Protein, UA 08/24/2018 Negative  Negative Final    Glucose, UA 08/24/2018 Negative  Negative Final    Ketones, UA 08/24/2018 Negative  Negative Final    Bilirubin (UA) 08/24/2018 Negative  Negative Final    Occult Blood UA 08/24/2018 Negative  Negative Final    Nitrite, UA 08/24/2018 Negative  Negative Final    Urobilinogen, UA 08/24/2018 2.0  <2.0 EU/dL Final    Leukocytes, UA 08/24/2018 Negative  Negative Final    Microalbum.,U,Random 08/24/2018 72.0  ug/mL Final    Creatinine, Random Ur 08/24/2018 272.0  15.0 - 325.0 mg/dL Final     Microalb Creat Ratio 08/24/2018 26.5  0.0 - 30.0 ug/mg Final    WBC, UA 08/24/2018 1  0 - 5 /hpf Final    Squam Epithel, UA 08/24/2018 36  /hpf Final    Hyaline Casts, UA 08/24/2018 30* 0-1/lpf /lpf Final    Amorphous, UA 08/24/2018 Many* None-Moderate Final    Microscopic Comment 08/24/2018 SEE COMMENT   Final   Lab Visit on 08/22/2018   Component Date Value Ref Range Status    WBC 08/22/2018 5.05  3.90 - 12.70 K/uL Final    RBC 08/22/2018 4.32  4.00 - 5.40 M/uL Final    Hemoglobin 08/22/2018 11.4* 12.0 - 16.0 g/dL Final    Hematocrit 08/22/2018 33.8* 37.0 - 48.5 % Final    MCV 08/22/2018 78* 82 - 98 fL Final    MCH 08/22/2018 26.4* 27.0 - 31.0 pg Final    MCHC 08/22/2018 33.7  32.0 - 36.0 g/dL Final    RDW 08/22/2018 16.0* 11.5 - 14.5 % Final    Platelets 08/22/2018 224  150 - 350 K/uL Final    MPV 08/22/2018 12.0  9.2 - 12.9 fL Final    Immature Granulocytes 08/22/2018 0.2  0.0 - 0.5 % Final    Gran # (ANC) 08/22/2018 2.1  1.8 - 7.7 K/uL Final    Immature Grans (Abs) 08/22/2018 0.01  0.00 - 0.04 K/uL Final    Lymph # 08/22/2018 2.1  1.0 - 4.8 K/uL Final    Mono # 08/22/2018 0.5  0.3 - 1.0 K/uL Final    Eos # 08/22/2018 0.3  0.0 - 0.5 K/uL Final    Baso # 08/22/2018 0.06  0.00 - 0.20 K/uL Final    nRBC 08/22/2018 0  0 /100 WBC Final    Gran% 08/22/2018 42.0  38.0 - 73.0 % Final    Lymph% 08/22/2018 42.2  18.0 - 48.0 % Final    Mono% 08/22/2018 8.9  4.0 - 15.0 % Final    Eosinophil% 08/22/2018 5.5  0.0 - 8.0 % Final    Basophil% 08/22/2018 1.2  0.0 - 1.9 % Final    Differential Method 08/22/2018 Automated   Final    Sodium 08/22/2018 139  136 - 145 mmol/L Final    Potassium 08/22/2018 5.7* 3.5 - 5.1 mmol/L Final    Chloride 08/22/2018 111* 95 - 110 mmol/L Final    CO2 08/22/2018 20* 23 - 29 mmol/L Final    Glucose 08/22/2018 87  70 - 110 mg/dL Final    BUN, Bld 08/22/2018 53* 8 - 23 mg/dL Final    Creatinine 08/22/2018 2.2* 0.5 - 1.4 mg/dL Final    Calcium 08/22/2018 9.8  8.7 -  10.5 mg/dL Final    Total Protein 08/22/2018 6.9  6.0 - 8.4 g/dL Final    Albumin 08/22/2018 3.6  3.5 - 5.2 g/dL Final    Total Bilirubin 08/22/2018 0.7  0.1 - 1.0 mg/dL Final    Alkaline Phosphatase 08/22/2018 68  55 - 135 U/L Final    AST 08/22/2018 16  10 - 40 U/L Final    ALT 08/22/2018 10  10 - 44 U/L Final    Anion Gap 08/22/2018 8  8 - 16 mmol/L Final    eGFR if  08/22/2018 25.2* >60 mL/min/1.73 m^2 Final    eGFR if non African American 08/22/2018 21.9* >60 mL/min/1.73 m^2 Final    Hemoglobin A1C 08/22/2018 5.5  4.0 - 5.6 % Final    Estimated Avg Glucose 08/22/2018 111  68 - 131 mg/dL Final    Cholesterol 08/22/2018 156  120 - 199 mg/dL Final    Triglycerides 08/22/2018 107  30 - 150 mg/dL Final    HDL 08/22/2018 41  40 - 75 mg/dL Final    LDL Cholesterol 08/22/2018 93.6  63.0 - 159.0 mg/dL Final    HDL/Chol Ratio 08/22/2018 26.3  20.0 - 50.0 % Final    Total Cholesterol/HDL Ratio 08/22/2018 3.8  2.0 - 5.0 Final    Non-HDL Cholesterol 08/22/2018 115  mg/dL Final    TSH 08/22/2018 1.624  0.400 - 4.000 uIU/mL Final    Hepatitis C Ab 08/22/2018 Negative   Final       Imaging:   Independent review of images:                    Impression       No evidence of acute infarction.    Chronic microvascular ischemic change with multiple lacunar-type infarcts of the maycol.  There are several small bilateral cerebellar infarcts, right more than the left.    Loss of the expected T2 flow void of the right vertebral artery consistent with very slow flow or occlusion.  Consider further evaluation with CTA, specifically to evaluate the posterior circulation.    Empty sella configuration.    This report was flagged in Epic as abnormal.    Electronically signed by resident: Norm Serna  Date: 09/26/2018       EEG 10-29-18:  IMPRESSION: none  This is an abnormal awake and drowsy EEG due to the finding of   non-specific cerebral dysfunction in the left temporal region.    There is no indication of  seizure tendency.    Keven Boone MD  Neurology-Epilepsy      Assessment and Plan     Vascular dementia with behavior disturbance    History of stroke    Depressive disorder    Hyperlipidemia, unspecified hyperlipidemia type    Essential hypertension    Coronary artery disease involving native coronary artery of native heart without angina pectoris    Congestive heart failure, unspecified HF chronicity, unspecified heart failure type    Type 2 diabetes mellitus with chronic kidney disease, with long-term current use of insulin, unspecified CKD stage    Morbid obesity    B12 deficiency        Medical Decision Making:    Discussed vascular RF - stressed importance and compliance of medications.   Discussed importance of B12 supplementation. Consider repeat B12 level in 6 months to assure aborption.   Discussed strategies for medication compliance.   Discussed strategies for behavior situations when they arise.   Follow up 6 months in memory clinic.         I spent 70 minutes face-to-face with the patient and granddaughter with >50% of the time spent with counseling and education regarding:  - results of data, diagnosis, and recommendations stated above  - the prognosis of memory loss  - rationale of vascular RF control  - importance of diet and exercise    Vesna Christine, HERBIE, NP-C  Division of Movement and Memory Disorders  [facility]  [phone]

## 2018-11-14 NOTE — TELEPHONE ENCOUNTER
----- Message from Sandra Tavares sent at 11/14/2018  8:45 AM CST -----  Contact: Leslye @ [facility], Direct# [phone], Fax# [phone]  Leslye said that she have received an order on today for a power operated motorized wheel chair. She said that they only carry a manual wheel chair.

## 2018-11-14 NOTE — TELEPHONE ENCOUNTER
Sent a my [facility] message to the patient grand daughter concerning the manual wheel chair or any options.

## 2018-11-15 ENCOUNTER — PATIENT MESSAGE (OUTPATIENT)
Dept: INTERNAL MEDICINE | Facility: CLINIC | Age: 73
End: 2018-11-15

## 2018-11-15 NOTE — PROGRESS NOTES
NEUROBEHAVIORAL STATUS EXAMINATION - [facility]  [facility] DISORD[facility]INIC    MEDICAL NECESSITY: Evaluate cognitive and neuropsychiatric symptoms in the setting of cognitive decline  [address]39 - 2 hours    HISTORY OF PRESENT ILLNESS: Ms. Cesia Stevens is a 73-year-old -American female with 4 years of education who was referred to the [facility] Clinic in the setting of cognitive and functional impairment. She was pulled from formal education during fifth grade in order to help/support her parents and raise her younger siblings. 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     Ms. Stevens was living independently with her  until 6/2018 after an episode of LOC. Recent EEG was not suggestive of epileptic activity. Family members had been concerned about her functional independence for some time as she has a history of stroke while her  is in his 90s. Ms. Stevens and her  moved in with their daughter following the episode of LOC. Their granddaughter also moved in temporarily to assist in caregiving. They plan to transition them to an independent living unit of a senior community in the near future. Family will provide considerable oversight following the transition.     Ms. Stevens granddaughter had difficulty describing her cognition prior to starting caregiving in 6/2018. She does suspect that she has been experiencing cognitive decline for at least several years, noting an instance when she had to stop at a [employer] on the way to her daughters house to ask for directions. Her granddaughter has noticed cognitive dysfunction and neuropsychiatric symptoms for the past 5 months. Ms. Stevens is especially paranoid/delusional. She initially refused to take medications due to believing that her family was trying to poison her. She feels they are intentionally serving her different food, which she also sees as a sign of poisoning. She thought they were putting a laxative in her  "coffee. She thinks that her room is bugged because of television wires. She also thinks there are cameras behind the lights. She appears hypervigilant, waking up at the slightest sound during the night. Her granddaughter also noted considerable emotional lability, with a tendency to focus on traumatic past events, such as family members deaths. She reported sundowning with episodic confusion at night.     IADLS/DAILY FUNCTIONING:  Medication Compliance: She initially thought that her family was trying to poison her and, as a result, began hiding her medications. This has improved, but she still regularly questions her family. Her granddaughter approaches her very gently on a daily basis and feels that she takes her medications about 80% of the time.   Appointment Management: Granddaughter manages.   Financial Management: Granddaughter and daughter manages.   Cooking: She is not cooking.   Driving: She has not driven since 2018. She wants to drive, but her family has not allowed her to this point.     CURRENT MEDICATIONS:   amLODIPine (NORVASC) 5 MG tablet     aspirin (ECOTRIN) 81 MG EC tablet    atorvastatin (LIPITOR) 40 MG tablet    BD ULTRA-FINE MINI PEN NEEDLE 31 gauge x 3/16" Ndle    clopidogrel (PLAVIX) 75 mg tablet    cyanocobalamin (VITAMIN B-12) 1000 MCG tablet    donepezil (ARICEPT) 10 MG tablet    ergocalciferol (ERGOCALCIFEROL) 50,000 unit Cap    insulin detemir U-100 (LEVEMIR) 100 unit/mL injection    insulin syringe-needle U-100 1/2 mL 30 gauge Syrg    methocarbamol (ROBAXIN) 750 MG Tab    traZODone (DESYREL) 50 MG tablet     NEUROIMAGIN2018: No evidence of acute infarction. Chronic microvascular ischemic change with multiple lacunar-type infarcts of the maycol.  There are several small bilateral cerebellar infarcts, right more than the left. Loss of the expected T2 flow void of the right vertebral artery consistent with very slow flow or occlusion.  Consider further evaluation with CTA, " specifically to evaluate the posterior circulation.    BEHAVIORAL OBSERVATIONS/TEST RESULTS: Ms. Stevens was not fully oriented to time. She incorrectly stated the day of the week, date, and year. She correctly stated the month. She did not know the city she was in, but correctly stated that she was at [facility]. She scored 11/30 on the MoCA in 9/2018.     A composite measure of Ms. Stevens global cognitive abilities was in the extremely low range. Overall encoding of a supraspan word list was borderline as she recalled 3, 5, 5, and 5 of 10 words across the learning trials. She was unable to freely recall any words following a long delay. Recognition was also below expectation with a strong negative response bias (3/10 hits, 0 fps). Overall encoding of a short story was extremely low. She recalled only 1 detail following a long delay. Incidental visual memory for a previously copied figure was low average. Copy of a complex figure was suggestive of mild/moderate visuospatial dysfunction. Semantic verbal fluency, encoding, and processing speed were extremely low.     IMPRESSIONS AND PLAN:     Diagnosis: Vascular Dementia with behavioral disturbance (paranoia). Possible mixed etiology with Alzheimers disease, although she does not appear completely amnestic at the present time.    Recommendations:    1. Level of Care - Appropriately managed. Ms. Stevens will require a high level of care after transitioning to an independent living unit. It is recommended that family continue providing support in medication, financial, and appointment management. They also may consider in-home care to assist with bathing and medication management.   2. Management of Paranoia - Discussed non-pharmacological strategies for managing paranoia. Follow-up with neurology regarding possible medication management.   3. Follow-up - [facility] in 6 months    Demarco France Psy.D., ABPP  Board Certified in Clinical  Neuropsychology  Department of Neurology    APPENDIX  TESTS ADMINISTERED:  Clinical Interview and Review of Records, Repeatable Battery for the Assessment of Neuropsychological Status (RBANS, Form A).       RBANS   Index  Immediate Memory  65    Visuospatial/Construction 72   Language   60    Attention   53    Delayed Memory  48    Total    52      Subtests   Scaled Scores  List learning   5  Story Memory   3  Figure Copy   4  Line Orientation  17-25%  Naming   3-9%  Fluency   2  Digit Span   4  Coding    2  List Recall   <2%  List Recognition  <2%  Story Recall   2  Figure Recall   6

## 2018-12-07 ENCOUNTER — TELEPHONE (OUTPATIENT)
Dept: INTERNAL MEDICINE | Facility: CLINIC | Age: 73
End: 2018-12-07

## 2018-12-07 NOTE — TELEPHONE ENCOUNTER
I was courtesy calling & was informed by the family that the pt no longer lives in [place]. She has moved to [place].

## 2020-12-07 NOTE — TELEPHONE ENCOUNTER
Please inform patient of test results:   Your vitamin D level is low. You will need to take a medication once weekly for the next 12 weeks called ergocalciferol. This is a prescription and sent to your pharmacy on file. Then, I recommend you take 2000 units of Vitamin D3 over the counter daily.   Iron levels are normal.   Kidney function slightly improved but still decreased. Potassium is still slightly high, avoid high potassium foods such as bananas. Please keep nephrology appointment tomorrow.       Ftsg Text: The defect edges were debeveled with a #15 scalpel blade.  Given the location of the defect, shape of the defect and the proximity to free margins a full thickness skin graft was deemed most appropriate.  Using a sterile surgical marker, the primary defect shape was transferred to the donor site. The area thus outlined was incised deep to adipose tissue with a #15 scalpel blade.  The harvested graft was then trimmed of adipose tissue until only dermis and epidermis was left.  The skin margins of the secondary defect were undermined to an appropriate distance in all directions utilizing iris scissors.  The secondary defect was closed with interrupted buried subcutaneous sutures.  The skin edges were then re-apposed with running  sutures.  The skin graft was then placed in the primary defect and oriented appropriately.

## 2023-04-27 NOTE — TELEPHONE ENCOUNTER
Patient's daughter informed of results and verbalized understanding.  
Please inform patient of test results:   Mild anemia. We will check iron panel next time she does labwork. Cholesterol in good range on her medication. Hepatitis C screening test is negative. A1c is 5.5 indicating great control of her diabetes - continue to monitor glucose at home and bring log to f/u appt. Thyroid is in normal limits.     
syncope